# Patient Record
Sex: MALE | Race: WHITE
[De-identification: names, ages, dates, MRNs, and addresses within clinical notes are randomized per-mention and may not be internally consistent; named-entity substitution may affect disease eponyms.]

---

## 2017-01-04 ENCOUNTER — HOSPITAL ENCOUNTER (EMERGENCY)
Dept: HOSPITAL 17 - NEPB | Age: 47
Discharge: HOME | End: 2017-01-04
Payer: SELF-PAY

## 2017-01-04 VITALS — BODY MASS INDEX: 31.05 KG/M2 | WEIGHT: 229.28 LBS | HEIGHT: 72 IN

## 2017-01-04 VITALS
DIASTOLIC BLOOD PRESSURE: 90 MMHG | HEART RATE: 84 BPM | RESPIRATION RATE: 16 BRPM | OXYGEN SATURATION: 98 % | TEMPERATURE: 97.8 F | SYSTOLIC BLOOD PRESSURE: 144 MMHG

## 2017-01-04 DIAGNOSIS — I25.2: ICD-10-CM

## 2017-01-04 DIAGNOSIS — I10: ICD-10-CM

## 2017-01-04 DIAGNOSIS — H69.83: ICD-10-CM

## 2017-01-04 DIAGNOSIS — J01.90: Primary | ICD-10-CM

## 2017-01-04 DIAGNOSIS — J45.909: ICD-10-CM

## 2017-01-04 PROCEDURE — 99283 EMERGENCY DEPT VISIT LOW MDM: CPT

## 2017-01-04 NOTE — PD
HPI


Chief Complaint:  ENT Complaint


Time Seen by Provider:  06:40


Travel History


International Travel<30 days:  No


Contact w/Intl Traveler<30days:  No


Traveled to known affect area:  No





History of Present Illness


HPI


This is a 46-year-old male who presents for evaluation of nasal congestion, 

sinus pressure, bilateral ear pressure and decreased hearing in the ears.  

Symptoms started 4 days ago.  He feels as if sound is muffled in both ears.  He 

has been using over-the-counter cough and cold medications but symptoms 

persisted which prompted evaluation.  Denies any ear discharge.  No fevers or 

chills.  No recent travel, recent swimming.  He has no other complaints at this 

time.





PFSH


Past Medical History


Hx Anticoagulant Therapy:  Yes (ASA)


Arthritis:  No


Asthma:  Yes


Autoimmune Disease:  No


Blood Disorders:  No


Anxiety:  No


Depression:  No


Heart Rhythm Problems:  Yes (BRADYCARDIC)


Cancer:  No


Cardiac Catheterization:  Yes


Cardiovascular Problems:  Yes (MI 3-4 YRS AGO)


High Cholesterol:  No


Chemotherapy:  No


Chest Pain:  Yes


Congestive Heart Failure:  No


COPD:  No


Cerebrovascular Accident:  No


Diabetes:  No


Diminished Hearing:  No


Endocrine:  No


Gastrointestinal Disorders:  No


GERD:  No


Glaucoma:  No


Genitourinary:  No


Headaches:  No


Hepatitis:  No


Hiatal Hernia:  No


Heparin Induced Thrombocytopen:  No


Hypertension:  Yes


Immune Disorder:  No


Implanted Vascular Access Dvce:  No


Kidney Stones:  Yes


Musculoskeletal:  No


Neurologic:  No


Psychiatric:  No


Reproductive:  No


Respiratory:  Yes


Immunizations Current:  Yes


Migraines:  No


Myocardial Infarction:  Yes (X 2)


Radiation Therapy:  No


Renal Failure:  No


Seizures:  No


Sickle Cell Disease:  No


Sleep Apnea:  No


Thyroid Disease:  No


Ulcer:  No


Tetanus Vaccination:  < 5 Years


Influenza Vaccination:  Yes


PNEUMOCCOCAL Vaccine (Year):  1





Past Surgical History


Abdominal Surgery:  Yes (gallbladder out)


AICD:  No


Appendectomy:  No


Arteriovenous Shunt:  No


Cardiac Surgery:  No


Cholecystectomy:  Yes


Coronary Artery Bypass Graft:  No


Ear Surgery:  No


Endocrine Surgery:  No


Eye Surgery:  No


Genitourinary Surgery:  No


Gynecologic Surgery:  No


Insulin Pump:  No


Joint Replacement:  No


Neurologic Surgery:  No


Pacemaker:  No


Thoracic Surgery:  No


Tonsillectomy:  Yes


Other Surgery:  Yes





Family History


Family Myocardial Infarction:  Yes (GRANDFATHER MATERNIAL)





Social History


Alcohol Use:  No (DENIES)


Tobacco Use:  Yes (9-10 CIGARETTES)


Substance Use:  No (DENIES)





Allergies-Medications


(Allergen,Severity, Reaction):  


Coded Allergies:  


     No Known Allergies (Verified , 1/4/17)


Reported Meds & Prescriptions





Reported Meds & Active Scripts


Active








Review of Systems


Except as stated in HPI:  all other systems reviewed are Neg





Physical Exam


Narrative


GENERAL: Well-developed well-nourished male in no acute distress


SKIN: Warm and dry.


HEAD: Atraumatic. Normocephalic. 


EYES: Pupils equal and round. No scleral icterus. No injection or drainage. 


ENT: No nasal bleeding or discharge.  Mucous membranes pink and moist.  

Bilateral tympanic membranes are intact and visible with minimal cerumen.  

There are air-fluid levels presents without erythema or fluctuance of the 

tympanic membrane's.  No oropharyngeal erythema or exudate.


NECK: Trachea midline. No JVD.  Mild anterior cervical lymphadenopathy is 

present.


CARDIOVASCULAR: Regular rate and rhythm.  No murmur appreciated.


RESPIRATORY: No accessory muscle use. Clear to auscultation. Breath sounds 

equal bilaterally.





Data


Data


Last Documented VS





Vital Signs








  Date Time  Temp Pulse Resp B/P Pulse Ox O2 Delivery O2 Flow Rate FiO2


 


1/4/17 06:41   18     


 


1/4/17 06:32 97.8 84  144/90 98   











MDM


Medical Decision Making


Medical Screen Exam Complete:  Yes


Emergency Medical Condition:  Yes


Medical Record Reviewed:  Yes


Differential Diagnosis


Sinusitis, rhinitis, eustachian tube dysfunction, otitis media, serous otitis 

media, sensorineural hearing loss, cerumen impactions


Narrative Course


46-year-old male who has been having nasal congestion, bilateral ear pressure 

and decreased hearing in the ears for the past 4 days.  His examination and 

history are consistent with sinusitis with eustachian tube dysfunction, no 

evidence for acute otitis media.  Plan is to discharge the patient with Flonase 

steroid spray, amoxicillin.  He is stable for discharge.





Diagnosis





 Primary Impression:  


 Sinusitis


 Qualified Code:  J01.90 - Acute non-recurrent sinusitis, unspecified location


 Additional Impression:  


 Eustachian tube dysfunction


 Qualified Code:  H69.83 - Eustachian tube dysfunction, bilateral


Departure Forms:  Tests/Procedures, Work Release   Enter return to work date:  

Jan 5, 2017





***Additional Instructions:


Use the medication as prescribed.  Use over-the-counter nasal decongestants.  

Avoid tobacco products.  Follow-up with primary care physician.  Return for any 

emergent medical conditions.


***Med/Other Pt SpecificInfo:  Prescription(s) given


Scripts


Fluticasone Nasal [place] (Flonase Allergy Relief Children Nasal Spray)50 Mcg/Act 

Spray2 Spray EACH NARE DAILY  10 Days  Ref 0


   50 mcg/spray


   Prov:Saundra Monet MD         1/4/17 


Amoxicillin 875 Mg Nzp240 Mg PO BID  10 Days  Ref 0


   Prov:Saundra Monet MD         1/4/17


Disposition:  01 DISCHARGE HOME


Condition:  Stable








Dominik Driscoll Jan 4, 2017 06:50

## 2017-08-24 ENCOUNTER — HOSPITAL ENCOUNTER (OUTPATIENT)
Dept: HOSPITAL 17 - NEPE | Age: 47
Setting detail: OBSERVATION
LOS: 1 days | Discharge: HOME | End: 2017-08-25
Attending: SPECIALIST | Admitting: SPECIALIST
Payer: SELF-PAY

## 2017-08-24 VITALS
RESPIRATION RATE: 16 BRPM | HEART RATE: 66 BPM | OXYGEN SATURATION: 98 % | TEMPERATURE: 98.1 F | SYSTOLIC BLOOD PRESSURE: 116 MMHG | DIASTOLIC BLOOD PRESSURE: 75 MMHG

## 2017-08-24 VITALS — HEIGHT: 72 IN | WEIGHT: 207.23 LBS | BODY MASS INDEX: 28.07 KG/M2

## 2017-08-24 VITALS
RESPIRATION RATE: 16 BRPM | SYSTOLIC BLOOD PRESSURE: 137 MMHG | DIASTOLIC BLOOD PRESSURE: 71 MMHG | HEART RATE: 100 BPM | OXYGEN SATURATION: 98 %

## 2017-08-24 VITALS
DIASTOLIC BLOOD PRESSURE: 61 MMHG | RESPIRATION RATE: 16 BRPM | HEART RATE: 100 BPM | SYSTOLIC BLOOD PRESSURE: 115 MMHG | OXYGEN SATURATION: 98 %

## 2017-08-24 VITALS
HEART RATE: 62 BPM | OXYGEN SATURATION: 98 % | TEMPERATURE: 97.8 F | RESPIRATION RATE: 16 BRPM | SYSTOLIC BLOOD PRESSURE: 138 MMHG | DIASTOLIC BLOOD PRESSURE: 71 MMHG

## 2017-08-24 VITALS
RESPIRATION RATE: 16 BRPM | DIASTOLIC BLOOD PRESSURE: 76 MMHG | HEART RATE: 88 BPM | SYSTOLIC BLOOD PRESSURE: 121 MMHG | OXYGEN SATURATION: 98 %

## 2017-08-24 VITALS
OXYGEN SATURATION: 98 % | HEART RATE: 100 BPM | RESPIRATION RATE: 16 BRPM | SYSTOLIC BLOOD PRESSURE: 134 MMHG | DIASTOLIC BLOOD PRESSURE: 94 MMHG

## 2017-08-24 VITALS
HEART RATE: 110 BPM | OXYGEN SATURATION: 98 % | DIASTOLIC BLOOD PRESSURE: 78 MMHG | RESPIRATION RATE: 16 BRPM | TEMPERATURE: 97.7 F | SYSTOLIC BLOOD PRESSURE: 144 MMHG

## 2017-08-24 VITALS
RESPIRATION RATE: 18 BRPM | TEMPERATURE: 97.9 F | DIASTOLIC BLOOD PRESSURE: 58 MMHG | OXYGEN SATURATION: 98 % | HEART RATE: 60 BPM | SYSTOLIC BLOOD PRESSURE: 108 MMHG

## 2017-08-24 VITALS — HEART RATE: 61 BPM

## 2017-08-24 DIAGNOSIS — I11.9: ICD-10-CM

## 2017-08-24 DIAGNOSIS — R00.1: ICD-10-CM

## 2017-08-24 DIAGNOSIS — Z79.82: ICD-10-CM

## 2017-08-24 DIAGNOSIS — E78.5: ICD-10-CM

## 2017-08-24 DIAGNOSIS — R05: ICD-10-CM

## 2017-08-24 DIAGNOSIS — R07.9: Primary | ICD-10-CM

## 2017-08-24 DIAGNOSIS — F17.200: ICD-10-CM

## 2017-08-24 DIAGNOSIS — D72.829: ICD-10-CM

## 2017-08-24 DIAGNOSIS — I25.2: ICD-10-CM

## 2017-08-24 DIAGNOSIS — I42.9: ICD-10-CM

## 2017-08-24 DIAGNOSIS — I25.10: ICD-10-CM

## 2017-08-24 LAB
ALP SERPL-CCNC: 115 U/L (ref 45–117)
ALT SERPL-CCNC: 29 U/L (ref 12–78)
ANION GAP SERPL CALC-SCNC: 10 MEQ/L (ref 5–15)
APTT BLD: 25.9 SEC (ref 24.3–30.1)
AST SERPL-CCNC: 21 U/L (ref 15–37)
BASOPHILS # BLD AUTO: 0 TH/MM3 (ref 0–0.2)
BASOPHILS NFR BLD: 0.2 % (ref 0–2)
BILIRUB SERPL-MCNC: 0.6 MG/DL (ref 0.2–1)
BUN SERPL-MCNC: 7 MG/DL (ref 7–18)
CHLORIDE SERPL-SCNC: 111 MEQ/L (ref 98–107)
EOSINOPHIL # BLD: 0.1 TH/MM3 (ref 0–0.4)
EOSINOPHIL NFR BLD: 0.6 % (ref 0–4)
ERYTHROCYTE [DISTWIDTH] IN BLOOD BY AUTOMATED COUNT: 14.3 % (ref 11.6–17.2)
GFR SERPLBLD BASED ON 1.73 SQ M-ARVRAT: 68 ML/MIN (ref 89–?)
HCO3 BLD-SCNC: 21.4 MEQ/L (ref 21–32)
HCT VFR BLD CALC: 49.4 % (ref 39–51)
HEMO FLAGS: (no result)
INR PPP: 1 RATIO
LYMPHOCYTES # BLD AUTO: 1.5 TH/MM3 (ref 1–4.8)
LYMPHOCYTES NFR BLD AUTO: 8.5 % (ref 9–44)
MCH RBC QN AUTO: 29.4 PG (ref 27–34)
MCHC RBC AUTO-ENTMCNC: 33.3 % (ref 32–36)
MCV RBC AUTO: 88.2 FL (ref 80–100)
MONOCYTES NFR BLD: 4.6 % (ref 0–8)
NEUTROPHILS # BLD AUTO: 15.3 TH/MM3 (ref 1.8–7.7)
NEUTROPHILS NFR BLD AUTO: 86.1 % (ref 16–70)
PLATELET # BLD: 235 TH/MM3 (ref 150–450)
POTASSIUM SERPL-SCNC: 4 MEQ/L (ref 3.5–5.1)
PROTHROMBIN TIME: 10.7 SEC (ref 9.8–11.6)
RBC # BLD AUTO: 5.6 MIL/MM3 (ref 4.5–5.9)
SODIUM SERPL-SCNC: 142 MEQ/L (ref 136–145)
WBC # BLD AUTO: 17.7 TH/MM3 (ref 4–11)

## 2017-08-24 PROCEDURE — 83690 ASSAY OF LIPASE: CPT

## 2017-08-24 PROCEDURE — C1769 GUIDE WIRE: HCPCS

## 2017-08-24 PROCEDURE — 87040 BLOOD CULTURE FOR BACTERIA: CPT

## 2017-08-24 PROCEDURE — 85025 COMPLETE CBC W/AUTO DIFF WBC: CPT

## 2017-08-24 PROCEDURE — 80061 LIPID PANEL: CPT

## 2017-08-24 PROCEDURE — 84484 ASSAY OF TROPONIN QUANT: CPT

## 2017-08-24 PROCEDURE — 93005 ELECTROCARDIOGRAM TRACING: CPT

## 2017-08-24 PROCEDURE — 80053 COMPREHEN METABOLIC PANEL: CPT

## 2017-08-24 PROCEDURE — 71010: CPT

## 2017-08-24 PROCEDURE — 85027 COMPLETE CBC AUTOMATED: CPT

## 2017-08-24 PROCEDURE — 93454 CORONARY ARTERY ANGIO S&I: CPT

## 2017-08-24 PROCEDURE — 83605 ASSAY OF LACTIC ACID: CPT

## 2017-08-24 PROCEDURE — 85610 PROTHROMBIN TIME: CPT

## 2017-08-24 PROCEDURE — 87804 INFLUENZA ASSAY W/OPTIC: CPT

## 2017-08-24 PROCEDURE — G0378 HOSPITAL OBSERVATION PER HR: HCPCS

## 2017-08-24 PROCEDURE — 93306 TTE W/DOPPLER COMPLETE: CPT

## 2017-08-24 PROCEDURE — 99285 EMERGENCY DEPT VISIT HI MDM: CPT

## 2017-08-24 PROCEDURE — C1893 INTRO/SHEATH, FIXED,NON-PEEL: HCPCS

## 2017-08-24 PROCEDURE — 85730 THROMBOPLASTIN TIME PARTIAL: CPT

## 2017-08-24 RX ADMIN — NITROGLYCERIN SCH MG: 0.4 TABLET SUBLINGUAL at 15:01

## 2017-08-24 RX ADMIN — NITROGLYCERIN SCH MG: 0.4 TABLET SUBLINGUAL at 15:00

## 2017-08-24 RX ADMIN — ENOXAPARIN SODIUM SCH MG: 60 INJECTION SUBCUTANEOUS at 17:37

## 2017-08-24 RX ADMIN — NITROGLYCERIN SCH INCH: 20 OINTMENT TOPICAL at 17:37

## 2017-08-24 RX ADMIN — FAMOTIDINE SCH MG: 20 TABLET, FILM COATED ORAL at 20:37

## 2017-08-24 RX ADMIN — CARVEDILOL SCH MG: 3.12 TABLET, FILM COATED ORAL at 20:37

## 2017-08-24 RX ADMIN — PHENYTOIN SODIUM SCH MLS/HR: 50 INJECTION INTRAMUSCULAR; INTRAVENOUS at 17:37

## 2017-08-24 NOTE — RADRPT
EXAM DATE/TIME:  08/24/2017 12:17 

 

[facility] COMPARISON:     

CHEST SINGLE AP, April 24, 2014, 17:45.

 

                     

INDICATIONS :     

Chest pain today.

                     

 

MEDICAL HISTORY :     

Myocardial infarction.          

 

SURGICAL HISTORY :     

None.   

 

ENCOUNTER:     

Initial                                        

 

ACUITY:     

1 day      

 

PAIN SCORE:     

9/10

 

LOCATION:     

Bilateral chest 

 

FINDINGS:     

A single view of the chest demonstrates the lungs to be symmetrically aerated without evidence of mas
s, infiltrate or effusion.  The cardiomediastinal contours are unremarkable.  Osseous structures are 
intact.

 

CONCLUSION:     

1. No acute cardiopulmonary disease.

 

 

 

 Waldo Rodarte MD on August 24, 2017 at 12:38           

Board Certified Radiologist.

 This report was verified electronically.

## 2017-08-24 NOTE — HHI.HP
HPI


Service


[facility]


Primary Care Physician


Vesta Dasilva D.O.


Admission Diagnosis





chest pain


Diagnoses:  


Chief Complaint:  


cp, sob


Travel History


International Travel<30 Days:  No


Contact w/Intl Traveler <30 Da:  No


Traveled to Known Affected Are:  No


History of Present Illness


This a pleasant 47-year-old male with history of coronary artery disease, prior 

myocardial infarction, hypertension, hyperlipidemia, tobacco abuse.  Patient 

presented to the emergency room with complaints of chest pressure that started 

around 5 AM, it woke him out of sleep.  Indicates that it felt like a weight 

sitting on his chest associated with shortness of breath and diaphoresis.  

There was no radiation.  He took an aspirin and dropped to the emergency room..

  Pain was relieved by nitroglycerin given in the emergency room.  Indicates 

that chest discomfort was similar when he had his MI.  Patient also endorses 

that he went to work yesterday and throughout the day he felt short of breath 

with activity.  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 

lot but he keeps hydrated.  Denies any fever, no chills.  Indicates that his 

cardiologist is Dr. Jackson home he hasn't seen an 8 months, also endorses 

a history of irregular rhythm.  Patient had a cardiac catheter in  that 

showed mild CAD to the LAD with EF of 30-40%.  An echo in 2013 showed 

EF of 45-50%.At one point he was being considered for AICD however his EF 

improved with medical management.  He is not able to recall what medications he 

is taking at this time.  Laboratory workup was completed in the emergency room, 

troponin was negative.  He was noted with leukocytosis, no fever.  He was 

tachycardic in the emergency room, blood pressure was stable.  Cultures were 

obtained.  Patient was given aspirin, pravastatin and nitroglycerin sublingual.

  EKG did not reveal any acute findings.  At this time, patient remains stable, 

heart rate has come down to the 60s.  Patient is admitted for further 

evaluation and treatment.





Review of Systems


Constitutional:  DENIES: Diaphoretic episodes, Fatigue, Fever, Weight gain, 

Weight loss, Chills, Dizziness, Change in appetite, Night Sweats


Endocrine:  DENIES: Heat/cold intolerance, Polydipsia, Polyuria, Polyphagia


Eyes:  DENIES: Blurred vision, Diplopia, Eye inflammation, Eye pain, Vision loss

, Photosensitivity, Double Vision


Ears, nose, mouth, throat:  DENIES: Tinnitus, Hearing loss, Vertigo, Nasal 

discharge, Oral lesions, Throat pain, Hoarseness, Ear Pain, Running Nose, 

Epistaxis, Sinus Pain, Toothache, Odynophagia


Respiratory:  DENIES: Apneas, Cough, Snoring, Wheezing, Hemoptysis, Sputum 

production, Shortness of breath


Cardiovascular:  COMPLAINS OF: Chest pain, Dyspnea on Exertion, DENIES: 

Palpitations, Syncope, PND, Lower Extremity Edema, Orthopnea, Claudication


Gastrointestinal:  DENIES: Abdominal pain, Black stools, Bloody stools, 

Constipation, Diarrhea, Nausea, Vomiting, Difficulty Swallowing, Anorexia


Genitourinary:  DENIES: Sexual dysfunction, Urinary frequency, Urinary 

incontinence, Urgency, Hematuria, Dysuria, Nocturia, Penile Discharge, 

Testicular Pain, Testicular Swelling


Musculoskeletal:  DENIES: Joint pain, Muscle aches, Stiffness, Joint Swelling, 

Back pain, Neck pain


Integumentary:  DENIES: Abnormal pigmentation, Nail changes, Pruritus, Rash


Hematologic/lymphatic:  DENIES: Bruising, Lymphadenopathy


Immunologic/allergic:  DENIES: Eczema, Urticaria


Neurologic:  DENIES: Abnormal gait, Headache, Localized weakness, Paresthesias, 

Seizures, Speech Problems, Tremor, Poor Balance


Psychiatric:  DENIES: Anxiety, Confusion, Mood changes, Depression, 

Hallucinations, Agitation, Suicidal Ideation, Homicidal Ideation, Delusions





Past Family Social History


Past Medical History


Hypertension


Prior history of myocardial infarction x2 


Cardiomyopathy, echo in 2013 showed EF of 45-50%, treated medically


Irregular heartbeat


Hyperlipidemia


Possible COPD


Tobacco abuse


Past Surgical History


Cardiac catheter in 2013 showed mild CAD to the LAD, managed medically


Cholecystectomy


Reported Medications





Reported Meds & Active Scripts


Active


Lisinopril


Aspirin


Cholesterol medication


Allergies:  


Coded Allergies:  


     No Known Allergies (Verified , 17)


Active Ordered Medications





Inpatient Medications


Aspirin (Aspirin) 325 mg DAILY PO ;  Start 17 at 09:00


Carvedilol (Coreg) 3.125 mg Q12HR PO ;  Start 17 at 21:00


Enoxaparin Sodium (Lovenox Inj) 60 mg Q12H SQ ;  Start 17 at 17:00


Famotidine (Pepcid) 20 mg BID PO ;  Start 17 at 21:00


Nitroglycerin (Nitroglycerin 2% Oint) 1 inch Q6HR TOPICAL ;  Start 17 at 18

:00


Nitroglycerin (Nitrostat Sl) 0.4 mg Q5M SL  Last administered on t 15:01

;  Start 17 at 15:00;  Stop 17 at 15:11;  Status DC


Pravastatin Sodium (Pravachol) 40 mg DAILY PO ;  Start 17 at 09:00


Sodium Chloride 1,000 ml @  84 mls/hr [id number] IV ;  Start 17 at 16:00


Family History


Mother and father both disease, he thinks they had a history coronary artery 

disease, CHF


Social History


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  Smokes 1 pack a day for 

15 years, drinks 1 beer a day, no illegal drug use.





Physical Exam


Vital Signs





Vital Signs








  Date Time  Temp Pulse Resp B/P (MAP) Pulse Ox O2 Delivery O2 Flow Rate FiO2


 


17 16:14 97.8 62 16 138/71 (93) 98   


 


17 14:00 98.1 66 16 116/75 (89) 98   


 


17 12:30  88 16 121/76 (91) 98   


 


17 11:43  100 16 115/61 (79) 98   


 


17 11:35  100 16 137/71 (93) 98   


 


17 11:27  100 16 134/94 (107) 98   


 


17 11:18  114 18  97 Room Air  


 


17 11:13 97.7 110 16 144/78 (100) 98   








Physical Exam


GENERAL: This is a well-nourished, well-developed patient, in no apparent 

distress.


SKIN: No rashes, ecchymoses or lesions. Cool and dry.


HEAD: Atraumatic. Normocephalic. No temporal or scalp tenderness.


EYES: Pupils equal round and reactive. Extraocular motions intact. No scleral 

icterus. No injection or drainage. 


ENT: Nose without bleeding, purulent drainage or septal hematoma. Throat 

without erythema, tonsillar hypertrophy or exudate. Uvula midline. Airway 

patent.


NECK: Trachea midline. No JVD or lymphadenopathy. Supple, nontender, no 

meningeal signs.


CARDIOVASCULAR: Regular rate and rhythm without murmurs, gallops, or rubs. 


RESPIRATORY: Faint expiratory wheezing, diminished at bases


GASTROINTESTINAL: Abdomen soft, non-tender, nondistended. No hepato-splenomegaly

, or palpable masses. No guarding.


MUSCULOSKELETAL: Extremities without clubbing, cyanosis, or edema. No joint 

tenderness, effusion, or edema noted. No calf tenderness. Negative Homans sign 

bilaterally.


NEUROLOGICAL: Awake, oriented 3.  No focal deficits


Laboratory





Laboratory Tests








Test


  17


11:50 17


13:38


 


White Blood Count 17.7  


 


Red Blood Count 5.60  


 


Hemoglobin 16.5  


 


Hematocrit 49.4  


 


Mean Corpuscular Volume 88.2  


 


Mean Corpuscular Hemoglobin 29.4  


 


Mean Corpuscular Hemoglobin


Concent 33.3 


  


 


 


Red Cell Distribution Width 14.3  


 


Platelet Count 235  


 


Mean Platelet Volume 9.1  


 


Neutrophils (%) (Auto) 86.1  


 


Lymphocytes (%) (Auto) 8.5  


 


Monocytes (%) (Auto) 4.6  


 


Eosinophils (%) (Auto) 0.6  


 


Basophils (%) (Auto) 0.2  


 


Neutrophils # (Auto) 15.3  


 


Lymphocytes # (Auto) 1.5  


 


Monocytes # (Auto) 0.8  


 


Eosinophils # (Auto) 0.1  


 


Basophils # (Auto) 0.0  


 


CBC Comment DIFF FINAL  


 


Differential Comment   


 


Prothrombin Time 10.7  


 


Prothromb Time International


Ratio 1.0 


  


 


 


Activated Partial


Thromboplast Time 25.9 


  


 


 


Blood Urea Nitrogen 7  


 


Creatinine 1.15  


 


Random Glucose 82  


 


Total Protein 7.1  


 


Albumin 3.5  


 


Calcium Level 8.8  


 


Alkaline Phosphatase 115  


 


Aspartate Amino Transf


(AST/SGOT) 21 


  


 


 


Alanine Aminotransferase


(ALT/SGPT) 29 


  


 


 


Total Bilirubin 0.6  


 


Sodium Level 142  


 


Potassium Level 4.0  


 


Chloride Level 111  


 


Carbon Dioxide Level 21.4  


 


Anion Gap 10  


 


Estimat Glomerular Filtration


Rate 68 


  


 


 


Troponin I LESS THAN 0.02  


 


Lactic Acid Level  1.0 


 


Lipase  107 














 Date/Time


Source Procedure


Growth Status


 


 


 17 13:43


Blood Peripheral Aerobic Blood Culture


Pending Received


 


 17 13:43


Blood Peripheral Anaerobic Blood Culture


Pending Received


 


 17 13:44


Nasal Washing Influenza Types A,B Antigen (MIKAYLA) - Final


NEGATIVE FOR FLU A AND B ANTIGEN.... Complete








Result Diagram:  


17 1150                                                                   

             17 1150





Imaging





Last Impressions








Chest X-Ray 17 0000 Signed





Impressions: 





 Service Date/Time:   12:17 - CONCLUSION:  1. No acute 





 cardiopulmonary disease.     Alireza Bozorgmanesh, MD Caprini VTE Risk Assessment


Caprinxiomara VTE Risk Assessment:  No/Low Risk (score <= 1)


Caprini Risk Assessment Model











 Point Value = 1          Point Value = 2  Point Value = 3  Point Value = 5


 


Age 41-60


Minor surgery


BMI > 25 kg/m2


Swollen legs


Varicose veins


Pregnancy or postpartum


History of unexplained or recurrent


   spontaneous 


Oral contraceptives or hormone


   replacement


Sepsis (< 1 month)


Serious lung disease, including


   pneumonia (< 1 month)


Abnormal pulmonary function


Acute myocardial infarction


Congestive heart failure (< 1 month)


History of inflammatory bowel disease


Medical patient at bed rest Age 61-74


Arthroscopic surgery


Major open surgery (> 45 min)


Laparoscopic surgery (> 45 min)


Malignancy


Confined to bed (> 72 hours)


Immobilizing plaster cast


Central venous access Age >= 75


History of VTE


Family history of VTE


Factor V Leiden


Prothrombin 00699O


Lupus anticoagulant


Anticardiolipin antibodies


Elevated serum homocysteine


Heparin-induced thrombocytopenia


Other congenital or acquired


   thrombophilia Stroke (< 1 month)


Elective arthroplasty


Hip, pelvis, or leg fracture


Acute spinal cord injury (< 1 month)








Prophylaxis Regimen











   Total Risk


Factor Score Risk Level Prophylaxis Regimen


 


0-1      Low Early ambulation


 


2 Moderate Order ONE of the following:


*Sequential Compression Device (SCD)


*Heparin 5000 units SQ BID


 


3-4 Higher Order ONE of the following medications:


*Heparin 5000 units SQ TID


*Enoxaparin/Lovenox 40 mg SQ daily (WT < 150 kg, CrCl > 30 mL/min)


*Enoxaparin/Lovenox 30 mg SQ daily (WT < 150 kg, CrCl > 10-29 mL/min)


*Enoxaparin/Lovenox 30 mg SQ BID (WT < 150 kg, CrCl > 30 mL/min)


AND/OR


*Sequential Compression Device (SCD)


 


5 or more Highest Order ONE of the following medications:


*Heparin 5000 units SQ TID (Preferred with Epidurals)


*Enoxaparin/Lovenox 40 mg SQ daily (WT < 150 kg, CrCl > 30 mL/min)


*Enoxaparin/Lovenox 30 mg SQ daily (WT < 150 kg, CrCl > 10-29 mL/min)


*Enoxaparin/Lovenox 30 mg SQ BID (WT < 150 kg, CrCl > 30 mL/min)


AND


*Sequential Compression Device (SCD)











Assessment and Plan


Problem List:  


(1) Chest pain


ICD Codes:  R07.9 - Chest pain, unspecified


Status:  Acute


(2) Cardiomyopathy


ICD Codes:  I42.9 - Cardiomyopathy, unspecified


Status:  Chronic


(3) CAD (coronary artery disease)


ICD Codes:  I25.10 - Atherosclerotic heart disease of native coronary artery 

without angina pectoris


Status:  Acute


(4) Hx of myocardial infarction


ICD Codes:  I25.2 - Old myocardial infarction


Status:  Chronic


(5) HTN (hypertension)


ICD Codes:  I10 - Essential (primary) hypertension


Status:  Chronic


(6) Tobacco abuse


ICD Codes:  Z72.0 - Tobacco use


Status:  Acute


(7) Hyperlipidemia


ICD Codes:  E78.5 - Hyperlipidemia, unspecified


Status:  Chronic


(8) Leukocytosis


ICD Codes:  D72.829 - Elevated white blood cell count, unspecified


Status:  Acute


Assessment and Plan


Admit to Dr. Morrow





47-year-old with history of coronary artery disease, presented to the emergency 

room with symptoms of dyspnea with exertion and chest pressure.  Initial 

troponin negative.





Chest pain, rule out acute coronary syndrome in patient with known coronary 

artery disease


-Continue with serial troponin


-Consult cardiology for evaluation


-Continue with aspirin, carvedilol, statin


-Nitroglycerin ointment 1 inch every 6


-Lovenox 60 mg subcutaneous every 12





History of cardiomyopathy, reports dyspnea with exertion.  Last echocardiogram 

in  showed EF of 45-50%


-2-D echo has been ordered





Hypertension


-Continue home medications





Hyperlipidemia


-Lipid profile for the morning


-Continue with pravastatin





Leukocytosis, etiology unclear, possibly secondary to stress response.  No fever

, no cough, no sputum production


-Hydrate cautiously, decrease IV fluids to 50 an hour





Home medications reviewed, initiated as indicated


Lovenox For DVT prophylaxis


Pepcid for GI prophylaxis





Plan of care discussed with the patient, attending and registered nurse.  

Further management of the patient will be dependent on the hospital course





This patient was seen by myself and Dr. Morrow, this H&P is written on his behalf





Problem Qualifiers





(1) Chest pain:  


Qualified Codes:  R07.9 - Chest pain, unspecified


(2) CAD (coronary artery disease):  


Qualified Codes:  I25.118 - Atherosclerotic heart disease of native coronary 

artery with other forms of angina pectoris


(3) HTN (hypertension):  


Qualified Codes:  I10 - Essential (primary) hypertension


(4) Hyperlipidemia:  


Qualified Codes:  E78.5 - Hyperlipidemia, unspecified


(5) Leukocytosis:  


Qualified Codes:  D72.829 - Elevated white blood cell count, unspecified








Joan Alex Aug 24, 2017 17:53

## 2017-08-24 NOTE — PD
HPI


Chief Complaint:  Cardiac Complaint


Time Seen by Provider:  11:22


Travel History


International Travel<30 days:  No


Contact w/Intl Traveler<30days:  No


Traveled to known affect area:  No





History of Present Illness


HPI


This is a 47-year-old male who reports that he's had 2 heart attacks in the 

past who presents to the emergency Department with onset of chest discomfort on 

the left side of his chest that started at 5 AM described as a pressure, 

constant, moderate severity associated with diaphoresis.  He denies any 

shortness of breath.  This is happened to him in the past he says it feels like 

his prior heart attacks.  He says he follows with Dr. Jackson.  He last saw 

her about 8 months ago.  He says she follows him for some sort of abnormal 

heart rhythm.  He denies any fevers or chills, cough, rhinorrhea or recent 

illness and he has not been on any prednisone recently.





PFSH


Past Medical History


Hx Anticoagulant Therapy:  Yes (ASA)


Arthritis:  No


Asthma:  Yes


Autoimmune Disease:  No


Blood Disorders:  No


Anxiety:  No


Depression:  No


Heart Rhythm Problems:  Yes (BRADYCARDIC)


Cancer:  No


Cardiac Catheterization:  Yes


Cardiovascular Problems:  Yes


High Cholesterol:  No


Chemotherapy:  No


Chest Pain:  Yes


Congestive Heart Failure:  No


COPD:  No


Cerebrovascular Accident:  No


Diabetes:  No


Diminished Hearing:  No


Endocrine:  No


Gastrointestinal Disorders:  No


GERD:  No


Glaucoma:  No


Genitourinary:  No


Headaches:  No


Hepatitis:  No


Hiatal Hernia:  No


Heparin Induced Thrombocytopen:  No


Hypertension:  Yes


Immune Disorder:  No


Implanted Vascular Access Dvce:  No


Kidney Stones:  Yes


Musculoskeletal:  No


Neurologic:  No


Psychiatric:  No


Reproductive:  No


Respiratory:  Yes


Immunizations Current:  Yes


Migraines:  No


Myocardial Infarction:  Yes (X 2)


Radiation Therapy:  No


Renal Failure:  No


Seizures:  No


Sickle Cell Disease:  No


Sleep Apnea:  No


Thyroid Disease:  No


Ulcer:  No


PNEUMOCCOCAL Vaccine (Year):  1





Past Surgical History


Abdominal Surgery:  Yes (gallbladder out)


AICD:  No


Appendectomy:  No


Arteriovenous Shunt:  No


Cardiac Surgery:  No


Cholecystectomy:  Yes


Coronary Artery Bypass Graft:  No


Ear Surgery:  No


Endocrine Surgery:  No


Eye Surgery:  No


Genitourinary Surgery:  No


Gynecologic Surgery:  No


Insulin Pump:  No


Joint Replacement:  No


Neurologic Surgery:  No


Pacemaker:  No


Thoracic Surgery:  No


Tonsillectomy:  Yes


Other Surgery:  Yes





Family History


Family Myocardial Infarction:  Yes (GRANDFATHER MATERNIAL)





Social History


Alcohol Use:  No (DENIES)


Tobacco Use:  Yes (9-10 CIGARETTES)


Substance Use:  No (DENIES)





Allergies-Medications


(Allergen,Severity, Reaction):  


Coded Allergies:  


     No Known Allergies (Verified , 1/4/17)


Reported Meds & Prescriptions





Reported Meds & Active Scripts


Active








Review of Systems


Except as stated in HPI:  all other systems reviewed are Neg





Physical Exam


Narrative


GENERAL:Well appearing, no acute distress, diaphoretic


SKIN: Focused skin assessment warm and dry.


HEAD: Atraumatic. Normocephalic. 


EYES: Pupils equal and round.  No injection or drainage. 


ENT:  Moist mucous membranes


NECK: Trachea midline. 


CARDIOVASCULAR: Regular rate and rhythm.  No murmur appreciated.


RESPIRATORY: Clear to auscultation. Breath sounds equal bilaterally. 


GASTROINTESTINAL: Abdomen soft, non-tender, nondistended. 


MUSCULOSKELETAL: No obvious deformities. 


NEUROLOGICAL: Awake and alert. No obvious cranial nerve deficits.  Moving all 

extremities.


PSYCHIATRIC: Appropriate mood and affect; insight and judgment normal.





Data


Data


Last Documented VS





Vital Signs








  Date Time  Temp Pulse Resp B/P (MAP) Pulse Ox O2 Delivery O2 Flow Rate FiO2


 


8/24/17 12:30  88 16 121/76 (91) 98   


 


8/24/17 11:18      Room Air  


 


8/24/17 11:13 97.7       








Orders





 Orders


Aspirin (Aspirin) (8/24/17 11:25)


Nitroglycerin Sl (Nitrostat Sl) (8/24/17 11:26)


Nitroglycerin Sl (Nitrostat Sl) (8/24/17 11:38)


Chest, Single Ap (8/24/17 )


Comprehensive Metabolic Panel (8/24/17 11:50)


Troponin I (8/24/17 11:50)


Act Partial Throm Time (Ptt) (8/24/17 11:50)


Prothrombin Time / Inr (Pt) (8/24/17 11:50)


Complete Blood Count With Diff (8/24/17 11:50)


Influenzae A/B Antigen (8/24/17 13:18)


Blood Culture (8/24/17 13:18)


Lactic Acid (8/24/17 13:18)


Lipase (8/24/17 13:20)


Admit Order (Ed Use Only) (8/24/17 13:43)





Labs





Laboratory Tests








Test


  8/24/17


11:50 8/24/17


13:38


 


White Blood Count 17.7 TH/MM3  


 


Red Blood Count 5.60 MIL/MM3  


 


Hemoglobin 16.5 GM/DL  


 


Hematocrit 49.4 %  


 


Mean Corpuscular Volume 88.2 FL  


 


Mean Corpuscular Hemoglobin 29.4 PG  


 


Mean Corpuscular Hemoglobin


Concent 33.3 % 


  


 


 


Red Cell Distribution Width 14.3 %  


 


Platelet Count 235 TH/MM3  


 


Mean Platelet Volume 9.1 FL  


 


Neutrophils (%) (Auto) 86.1 %  


 


Lymphocytes (%) (Auto) 8.5 %  


 


Monocytes (%) (Auto) 4.6 %  


 


Eosinophils (%) (Auto) 0.6 %  


 


Basophils (%) (Auto) 0.2 %  


 


Neutrophils # (Auto) 15.3 TH/MM3  


 


Lymphocytes # (Auto) 1.5 TH/MM3  


 


Monocytes # (Auto) 0.8 TH/MM3  


 


Eosinophils # (Auto) 0.1 TH/MM3  


 


Basophils # (Auto) 0.0 TH/MM3  


 


CBC Comment DIFF FINAL  


 


Differential Comment   


 


Prothrombin Time 10.7 SEC  


 


Prothromb Time International


Ratio 1.0 RATIO 


  


 


 


Activated Partial


Thromboplast Time 25.9 SEC 


  


 


 


Blood Urea Nitrogen 7 MG/DL  


 


Creatinine 1.15 MG/DL  


 


Random Glucose 82 MG/DL  


 


Total Protein 7.1 GM/DL  


 


Albumin 3.5 GM/DL  


 


Calcium Level 8.8 MG/DL  


 


Alkaline Phosphatase 115 U/L  


 


Aspartate Amino Transf


(AST/SGOT) 21 U/L 


  


 


 


Alanine Aminotransferase


(ALT/SGPT) 29 U/L 


  


 


 


Total Bilirubin 0.6 MG/DL  


 


Sodium Level 142 MEQ/L  


 


Potassium Level 4.0 MEQ/L  


 


Chloride Level 111 MEQ/L  


 


Carbon Dioxide Level 21.4 MEQ/L  


 


Anion Gap 10 MEQ/L  


 


Estimat Glomerular Filtration


Rate 68 ML/MIN 


  


 


 


Troponin I


  LESS THAN 0.02


NG/ML 


 


 


Lactic Acid Level  1.0 mmol/L 


 


Lipase  107 U/L 











[facility]


Medical Decision Making


Medical Screen Exam Complete:  Yes


Emergency Medical Condition:  Yes


Medical Record Reviewed:  Yes (patient was admitted in 2013 and at that time 

presented with chest pain.  He had an elevated troponin of 0.6.  He had a 

cardiac catheterization demonstrating no obvious occlusion but a reduced 

ejection fraction of 35%.  At that time he also had a leukocytosis of 20, 

similar to his presentation today)


Interpretation(s)


EKG: Normal sinus rhythm, no st changes


Leukocytosis


86% neutrophils


Electrolytes are reassuring


Troponin is normal


Lipase is normal


Lactic acid is 1


Chest x-ray: No acute process


Differential Diagnosis


Acute coronary syndrome, myocarditis, pneumonia, influenza


Narrative Course


This is a 47-year-old male who presents to the emergency department with left-

sided chest discomfort.  He had a strange admission back in 2013 where he had 

an elevated troponin but no coronary artery occlusion and was found to have a 

reduced ejection fraction at that time.  He says his symptoms are very similar.

  He was placed on a monitor and an IV was established.  EKG is nonischemic.  

Labs demonstrate a leukocytosis of 17 and a normal troponin.  Chest x-ray is 

unremarkable.  He doesn't localize any infectious signs.  I suspect is a viral 

syndrome and that they be causing his symptoms however given his history I 

think it's reasonable to place him in observation and obtain serial cardiac 

enzymes and discuss this case with cardiology.  Cultures were obtained and if 

the patient spikes a fever he should be covered with antibiotics.





Diagnosis





 Primary Impression:  


 Chest pain


 Qualified Codes:  R07.9 - Chest pain, unspecified





Admitting Information


Admitting Physician Requests:  Maria Ines Stock MD Aug 24, 2017 15:09

## 2017-08-25 VITALS
DIASTOLIC BLOOD PRESSURE: 57 MMHG | RESPIRATION RATE: 16 BRPM | TEMPERATURE: 97.8 F | SYSTOLIC BLOOD PRESSURE: 106 MMHG | OXYGEN SATURATION: 98 % | HEART RATE: 76 BPM

## 2017-08-25 VITALS
OXYGEN SATURATION: 99 % | DIASTOLIC BLOOD PRESSURE: 63 MMHG | TEMPERATURE: 97.7 F | SYSTOLIC BLOOD PRESSURE: 102 MMHG | HEART RATE: 57 BPM | RESPIRATION RATE: 20 BRPM

## 2017-08-25 VITALS
RESPIRATION RATE: 20 BRPM | SYSTOLIC BLOOD PRESSURE: 125 MMHG | HEART RATE: 57 BPM | TEMPERATURE: 97.8 F | DIASTOLIC BLOOD PRESSURE: 58 MMHG | OXYGEN SATURATION: 96 %

## 2017-08-25 VITALS
RESPIRATION RATE: 16 BRPM | DIASTOLIC BLOOD PRESSURE: 74 MMHG | SYSTOLIC BLOOD PRESSURE: 124 MMHG | TEMPERATURE: 97.4 F | OXYGEN SATURATION: 96 % | HEART RATE: 52 BPM

## 2017-08-25 VITALS — HEART RATE: 59 BPM

## 2017-08-25 VITALS — HEART RATE: 48 BPM

## 2017-08-25 VITALS — HEART RATE: 46 BPM

## 2017-08-25 LAB
ERYTHROCYTE [DISTWIDTH] IN BLOOD BY AUTOMATED COUNT: 14.5 % (ref 11.6–17.2)
HCT VFR BLD CALC: 44 % (ref 39–51)
HDLC SERPL-MCNC: 29.4 MG/DL (ref 40–60)
LDLC SERPL-MCNC: 68 MG/DL (ref 0–99)
MCH RBC QN AUTO: 28.7 PG (ref 27–34)
MCHC RBC AUTO-ENTMCNC: 31.9 % (ref 32–36)
MCV RBC AUTO: 90 FL (ref 80–100)
PLATELET # BLD: 196 TH/MM3 (ref 150–450)
RBC # BLD AUTO: 4.89 MIL/MM3 (ref 4.5–5.9)
REVIEW FLAG: (no result)
WBC # BLD AUTO: 11 TH/MM3 (ref 4–11)

## 2017-08-25 RX ADMIN — NITROGLYCERIN SCH INCH: 20 OINTMENT TOPICAL at 05:22

## 2017-08-25 RX ADMIN — ENOXAPARIN SODIUM SCH MG: 60 INJECTION SUBCUTANEOUS at 05:00

## 2017-08-25 RX ADMIN — NITROGLYCERIN SCH INCH: 20 OINTMENT TOPICAL at 00:00

## 2017-08-25 RX ADMIN — FAMOTIDINE SCH MG: 20 TABLET, FILM COATED ORAL at 09:00

## 2017-08-25 RX ADMIN — CARVEDILOL SCH MG: 3.12 TABLET, FILM COATED ORAL at 09:00

## 2017-08-25 RX ADMIN — NITROGLYCERIN SCH INCH: 20 OINTMENT TOPICAL at 11:20

## 2017-08-25 RX ADMIN — PHENYTOIN SODIUM SCH MLS/HR: 50 INJECTION INTRAMUSCULAR; INTRAVENOUS at 10:48

## 2017-08-25 NOTE — MA
cc:

BRAD BURNETT

****

 

 

DATE:

08/25/2017

 

PHYSICIAN:

Brad Burnett MD

 

PROCEDURE PERFORMED

Fluoroscopy interpretation.

left heart catheterization 01/03

Coronary angiography.

 

METHOD:

The risks, benefits and alternatives discussed with the patient.  The patient

understood and consented to the procedure.  The patient brought to the

catheterization lab and was placed on the catheterization table.  Right wrist

was prepped and draped in sterile fashion.  Right wrist was anesthetized 2%

lidocaine.  Right radial artery is cannulated 6-Amharic 7 cm sheath was placed

out difficulty.  200 mcg intra-arterial nitroglycerin addition of 2000 units of

intravenous heparin was administered.

 

LEFT HEART CATHETERIZATION:

6-Amharic JR-5 catheter advanced across the aortic valve without difficulty.

Intraventricular hemodynamics were measured 112 over 10 mmHg

 

Left ventricular end-diastolic pressure of 10 mmHg.  No significant aortic

stenosis by transaortic valve pullback gradient.

 

CORONARY ANGIOGRAPHY:

1.  Left main coronary angiographically normal.

2.  Left anterior descending coronary is a 40% stenosis at the bifurcation of a

moderate-sized diagonal branch remainder of the vessel has minor

irregularities.

3.  Left circumflex a dominant vessel giving rise to left-sided posterior

descending branch.  Ramus intermedius branch is present has mild luminal

irregularities proximally remainder of the circumflex has minor irregularities.

 

4.  Right coronaries nondominant, has minor luminal irregularities.

 

 

CONCLUSION

1. Mild to moderate nonobstructive mid left anterior descending coronary

     stenosis.

2. Normal left-sided filling pressures.

 

PLAN

Monitor the patient closely for any post procedural complications.  Hemo band

was applied.  We will add a long-acting nitrate, he may have small vessel

disease.  He can follow up as an outpatient with his primary care physician for

noncardiac workup.

 

 

 

 

                              _________________________________

                              MD CRUZITO Merchant/bryson

D:  8/25/2017/2:19 PM

T:  8/25/2017/5:43 PM

Visit #:  [id number]

Job #:  [id number]

NEETA

## 2017-08-25 NOTE — HHI.PR
Subjective


Subjective Remarks


no cp


+ cough, no sputum


no sob


wants to get up and walk around


tele reviewed, SB


no dizziness


no palpitations





Review of Systems


Constitutional


Constitutional Remarks


12 point ros completed, negative except as noted above





Vitals/Results


Vital Signs





Vital Signs








  Date Time  Temp Pulse Resp B/P (MAP) Pulse Ox O2 Delivery O2 Flow Rate FiO2


 


8/25/17 07:18 97.8 76 16 106/57 (73) 98   


 


8/25/17 04:20  48      


 


8/25/17 03:55 97.7 57 20 102/63 (76) 99   


 


8/25/17 00:38 97.8 57 20 125/58 (80) 96   


 


8/25/17 00:15  59      


 


8/24/17 20:25  61      


 


8/24/17 19:56 97.9 60 18 108/58 (75) 98   


 


8/24/17 16:14 97.8 62 16 138/71 (93) 98   


 


8/24/17 14:00 98.1 66 16 116/75 (89) 98   


 


8/24/17 12:30  88 16 121/76 (91) 98   


 


8/24/17 11:43  100 16 115/61 (79) 98   


 


8/24/17 11:35  100 16 137/71 (93) 98   


 


8/24/17 11:27  100 16 134/94 (107) 98   


 


8/24/17 11:18  114 18  97 Room Air  


 


8/24/17 11:13 97.7 110 16 144/78 (100) 98   








CBC/BMP:  


8/25/17 0403                                                                   

             8/24/17 1150





Lab Results





Laboratory Tests








Test


  8/24/17


11:50 8/24/17


13:38 8/24/17


20:00 8/25/17


04:03


 


White Blood Count 17.7 TH/MM3    11.0 TH/MM3 


 


Red Blood Count 5.60 MIL/MM3    4.89 MIL/MM3 


 


Hemoglobin 16.5 GM/DL    14.0 GM/DL 


 


Hematocrit 49.4 %    44.0 % 


 


Mean Corpuscular Volume 88.2 FL    90.0 FL 


 


Mean Corpuscular Hemoglobin 29.4 PG    28.7 PG 


 


Mean Corpuscular Hemoglobin


Concent 33.3 % 


  


  


  31.9 % 


 


 


Red Cell Distribution Width 14.3 %    14.5 % 


 


Platelet Count 235 TH/MM3    196 TH/MM3 


 


Mean Platelet Volume 9.1 FL    9.2 FL 


 


Neutrophils (%) (Auto) 86.1 %    


 


Lymphocytes (%) (Auto) 8.5 %    


 


Monocytes (%) (Auto) 4.6 %    


 


Eosinophils (%) (Auto) 0.6 %    


 


Basophils (%) (Auto) 0.2 %    


 


Neutrophils # (Auto) 15.3 TH/MM3    


 


Lymphocytes # (Auto) 1.5 TH/MM3    


 


Monocytes # (Auto) 0.8 TH/MM3    


 


Eosinophils # (Auto) 0.1 TH/MM3    


 


Basophils # (Auto) 0.0 TH/MM3    


 


CBC Comment DIFF FINAL    


 


Differential Comment     


 


Prothrombin Time 10.7 SEC    


 


Prothromb Time International


Ratio 1.0 RATIO 


  


  


  


 


 


Activated Partial


Thromboplast Time 25.9 SEC 


  


  


  


 


 


Blood Urea Nitrogen 7 MG/DL    


 


Creatinine 1.15 MG/DL    


 


Random Glucose 82 MG/DL    


 


Total Protein 7.1 GM/DL    


 


Albumin 3.5 GM/DL    


 


Calcium Level 8.8 MG/DL    


 


Alkaline Phosphatase 115 U/L    


 


Aspartate Amino Transf


(AST/SGOT) 21 U/L 


  


  


  


 


 


Alanine Aminotransferase


(ALT/SGPT) 29 U/L 


  


  


  


 


 


Total Bilirubin 0.6 MG/DL    


 


Sodium Level 142 MEQ/L    


 


Potassium Level 4.0 MEQ/L    


 


Chloride Level 111 MEQ/L    


 


Carbon Dioxide Level 21.4 MEQ/L    


 


Anion Gap 10 MEQ/L    


 


Estimat Glomerular Filtration


Rate 68 ML/MIN 


  


  


  


 


 


Troponin I


  LESS THAN 0.02


NG/ML 


  LESS THAN 0.02


NG/ML LESS THAN 0.02


NG/ML


 


Lactic Acid Level  1.0 mmol/L   


 


Lipase  107 U/L   


 


Triglycerides Level    153 MG/DL 


 


Cholesterol Level    128 MG/DL 


 


LDL Cholesterol    68 MG/DL 


 


HDL Cholesterol    29.4 MG/DL 


 


Cholesterol/HDL Ratio    4.35 RATIO 








Microbiology





 Microbiology


8/24/17 Aerobic Blood Culture, Received


          Pending


8/24/17 Anaerobic Blood Culture, Received


          Pending


8/24/17 Aerobic Blood Culture, Received


          Pending


8/24/17 Anaerobic Blood Culture, Received


          Pending


8/24/17 Influenza Types A,B Antigen (MIKAYLA) - Final, Complete


          NEGATIVE FOR FLU A AND B ANTIGEN....





Physical Exam


General


General Appearance:  Well Developed, Well Nourished, No Acute Distress, 

Comfortable





Eyes


Eye Exam:  Pupils Equal, Pupils Reactive





Ears & Nose


Ears & Nose Exam:  Nasal Mucosa Pink





Throat


Throat Exam:  Oral Mucosa Pink & Moist





Neck


Neck Exam:  Neck Supple, Trachea Midline





Pulmonary


Resp Exam:  Decreased Bases


Resp Remarks


exp. wheezes





Cardiology


CV Exam:  Bradycardia





Gastrointestinal/Abdomen


GI Exam:  Soft, Non-Tender, Bowel Sounds Present, Non-Distended





Musculoskeletal


MS Exam:  Joints Intact





Integumentary


Skin Exam:  Warm, Dry





Extremeties


Extremities Exam:  No Edema, Pedal Pulses Palpable





Neurologic


Neuro Exam:  Alert, Awake, Oriented, Speech Clear, Moving All Extremities, No 

Focal Deficits





Psychiatric


Psych Exam:  Appropriate Responses





VTE Prophylaxis


VTE Prophylaxis Meds:  Lovenox





Assessment/Plan


Problem List:  


(1) Chest pain


ICD Codes:  R07.9 - Chest pain, unspecified


Status:  Acute


(2) CAD (coronary artery disease)


ICD Codes:  I25.10 - Atherosclerotic heart disease of native coronary artery 

without angina pectoris


Status:  Acute


(3) Hyperlipidemia


ICD Codes:  E78.5 - Hyperlipidemia, unspecified


Status:  Chronic


(4) Leukocytosis


ICD Codes:  D72.829 - Elevated white blood cell count, unspecified


Status:  Acute


(5) Tobacco abuse


ICD Codes:  Z72.0 - Tobacco use


Status:  Acute


(6) Hx of myocardial infarction


ICD Codes:  I25.2 - Old myocardial infarction


Status:  Chronic


(7) HTN (hypertension)


ICD Codes:  I10 - Essential (primary) hypertension


Status:  Chronic


Assessment/Plan


47-year-old with history of coronary artery disease, presented to the emergency 

room with symptoms of dyspnea with exertion and chest pressure.  Initial 

troponin negative.





Chest pain, rule out acute coronary syndrome in patient with known coronary 

artery disease


-Continue with serial troponin-negative 


-Consult cardiology for evaluation, pending 


-Continue with aspirin, carvedilol, statin


-Nitroglycerin ointment 1 inch every 6


-Lovenox 60 mg subcutaneous every 12





History of cardiomyopathy, reports dyspnea with exertion.  Last echocardiogram 

in 2013 showed EF of 45-50%


-2-D echo has been ordered





Hypertension


BP actually trending low 100s, asymptomatic 


-Continue home medications





Hyperlipidemia


-Lipid profile results noted 


-Continue with pravastatin





Leukocytosis, etiology unclear, possibly secondary to stress response.  No fever

, no cough, no sputum production


-stop IVF


-WBC normal, no fever. 





Tobacco abuse


prob COPD


-counselled about smoking


-Duonebs PRN 








Lovenox For DVT prophylaxis


Pepcid for GI prophylaxis





NPO until card sees


f/u echo report


if no work up recommended, poss dc today 





D/W RN


D/W Dr. Morrow


D/W pt


This patient was seen by myself and Dr. Morrow, this note  is written on his 

behalf





Problem Qualifiers





(1) Chest pain:  


Qualified Codes:  R07.9 - Chest pain, unspecified


(2) CAD (coronary artery disease):  


Qualified Codes:  I25.118 - Atherosclerotic heart disease of native coronary 

artery with other forms of angina pectoris


(3) Hyperlipidemia:  


Qualified Codes:  E78.5 - Hyperlipidemia, unspecified


(4) Leukocytosis:  


Qualified Codes:  D72.829 - Elevated white blood cell count, unspecified


(5) HTN (hypertension):  


Qualified Codes:  I10 - Essential (primary) hypertension








Joan Alex Aug 25, 2017 08:10

## 2017-08-25 NOTE — EKG
Date Performed: 08/24/2017       Time Performed: 11:24:26

 

PTAGE:      47 years

 

EKG:      Sinus rhythm 

 

 NONSPECIFIC T-WAVE ABNORMALITY BORDERLINE ECG Compared to prior tracing no significant change 

 

 PREVIOUS TRACING               04/24/2014   [phone]

 

DOCTOR:   Kunal Norwood  Interpretating Date/Time  08/25/2017 11:45:02

## 2017-08-25 NOTE — PD.CONS
[facility] [place]


Service


CV


Consult Requested By





Reason for Consult


chest pain


Primary Care Physician


Vesta Dasilva D.O.


History of Present Illness


Here with nonobstructive CAD, s/p MI x 2013, HTN  and hyperlipidemia admitted 

for chest pain. He states he was awoken with retrosternal chest pressure. It 

lasted about an hour. It was relieved with SL NTG, It was associated with 

diaphoresis. He smokes 1 PPD cigarettes and there is no family history of 

premature cardiac disease in his family





Review of Systems


Consitutional:  DENIES: Fatigue, Fever, Chills, Weight gain, Weight loss


Eyes:  DENIES: Amaurosis Fugax, Change in vision


HEENT:  DENIES: Lightheadedness, Change in hearing


Respiratory:  DENIES: See HPI, Cough, Snoring, Shortness of breath, Wheezing, 

Sputum production


Cardiovascular:  COMPLAINS OF: See HPI


Gastrointestinal:  DENIES: Nausea, Vomiting, Change in bowel habits, Reflux, 

Bloody stools, Melena


Genitourinary:  DENIES: Urinary incontinence, Difficulty voiding


Integumentary:  DENIES: Rash


Neurologic:  DENIES: Tingling or numbness, Memory problems, Poor Balance, 

Stroke symptoms


Musculoskeletal:  DENIES: Joint pain, Muscle pain, Limited range of motion, 

Back pain


Psychiatric:  DENIES: Anxiety, Depression, Sleep disturbances


Hematologic:  DENIES: Bruising tendencies, Bleeding tendencies


Endocrine:  DENIES: Weight gain, Weight loss, Thyroid disease





Past Family Social History


Allergies:  


Coded Allergies:  


     No Known Allergies (Verified , 1/4/17)


Past Medical History


see HPI 


Cardiomyopathy, echo in 2013 showed EF of 45-50%, treated medically


Irregular heartbeat


Hyperlipidemia


Possible COPD


Tobacco abuse


Past Surgical History


see HPI 


Cholecystectomy


Reported Medications





Reported Meds & Active Scripts


Active


Active Ordered Medications





Current Medications








 Medications


  (Trade)  Dose


 Ordered  Sig/Yaneth


 Route  Start Time


 Stop Time Status Last Admin


 


  (Aspirin)  325 mg  DAILY


 PO  8/25/17 09:00


    8/25/17 09:00


 


 


  (Coreg)  3.125 mg  Q12HR


 PO  8/24/17 21:00


     


 


 


  (Nitroglycerin


 2% Oint)  1 inch  Q6HR


 TOPICAL  8/24/17 18:00


    8/24/17 17:37


 


 


  (Pravachol)  40 mg  DAILY


 PO  8/25/17 09:00


    8/25/17 09:00


 


 


  (Lovenox Inj)  60 mg  Q12H


 SQ  8/24/17 17:00


    8/24/17 17:37


 


 


  (Pepcid)  20 mg  BID


 PO  8/24/17 21:00


    8/25/17 09:00


 


 


 Sodium Chloride  1,000 ml @ 


 50 mls/hr  Q20H


 IV  8/24/17 16:00


    8/24/17 17:37


 








Family History


see HPI


Social History


see HPI 


drinks 1 beer a day


denies illicit drugs, yet drug screen is positive for marijuana





Physical Exam


Vital Signs





Vital Signs








  Date Time  Temp Pulse Resp B/P (MAP) Pulse Ox O2 Delivery O2 Flow Rate FiO2


 


8/25/17 07:18 97.8 76 16 106/57 (73) 98   


 


8/25/17 04:20  48      


 


8/25/17 03:55 97.7 57 20 102/63 (76) 99   


 


8/25/17 00:38 97.8 57 20 125/58 (80) 96   


 


8/25/17 00:15  59      


 


8/24/17 20:25  61      


 


8/24/17 19:56 97.9 60 18 108/58 (75) 98   


 


8/24/17 16:14 97.8 62 16 138/71 (93) 98   


 


8/24/17 14:00 98.1 66 16 116/75 (89) 98   


 


8/24/17 12:30  88 16 121/76 (91) 98   


 


8/24/17 11:43  100 16 115/61 (79) 98   


 


8/24/17 11:35  100 16 137/71 (93) 98   


 


8/24/17 11:27  100 16 134/94 (107) 98   


 


8/24/17 11:18  114 18  97 Room Air  


 


8/24/17 11:13 97.7 110 16 144/78 (100) 98   








Physical Exam


GENERAL: Well-nourished, well-developed patient in no apparent distress.


NECK: No JVD. No carotid bruit.


CARDIOVASCULAR: Regular rate and rhythm.  S1/S2 no murmur, rub, or gallop.


RESPIRATORY: No accessory muscle use. Clear to auscultation. Breath sounds 

equal bilaterally. 


GASTROINTESTINAL: Abdomen soft, non-tender, nondistended. 


MUSCULOSKELETAL: Extremities without clubbing, cyanosis, or edema.


Laboratory





Laboratory Tests








Test


  8/24/17


11:50 8/24/17


13:38 8/24/17


20:00 8/25/17


04:03


 


White Blood Count 17.7    11.0 


 


Red Blood Count 5.60    4.89 


 


Hemoglobin 16.5    14.0 


 


Hematocrit 49.4    44.0 


 


Mean Corpuscular Volume 88.2    90.0 


 


Mean Corpuscular Hemoglobin 29.4    28.7 


 


Mean Corpuscular Hemoglobin


Concent 33.3 


  


  


  31.9 


 


 


Red Cell Distribution Width 14.3    14.5 


 


Platelet Count 235    196 


 


Mean Platelet Volume 9.1    9.2 


 


Neutrophils (%) (Auto) 86.1    


 


Lymphocytes (%) (Auto) 8.5    


 


Monocytes (%) (Auto) 4.6    


 


Eosinophils (%) (Auto) 0.6    


 


Basophils (%) (Auto) 0.2    


 


Neutrophils # (Auto) 15.3    


 


Lymphocytes # (Auto) 1.5    


 


Monocytes # (Auto) 0.8    


 


Eosinophils # (Auto) 0.1    


 


Basophils # (Auto) 0.0    


 


CBC Comment DIFF FINAL    


 


Differential Comment     


 


Prothrombin Time 10.7    


 


Prothromb Time International


Ratio 1.0 


  


  


  


 


 


Activated Partial


Thromboplast Time 25.9 


  


  


  


 


 


Blood Urea Nitrogen 7    


 


Creatinine 1.15    


 


Random Glucose 82    


 


Total Protein 7.1    


 


Albumin 3.5    


 


Calcium Level 8.8    


 


Alkaline Phosphatase 115    


 


Aspartate Amino Transf


(AST/SGOT) 21 


  


  


  


 


 


Alanine Aminotransferase


(ALT/SGPT) 29 


  


  


  


 


 


Total Bilirubin 0.6    


 


Sodium Level 142    


 


Potassium Level 4.0    


 


Chloride Level 111    


 


Carbon Dioxide Level 21.4    


 


Anion Gap 10    


 


Estimat Glomerular Filtration


Rate 68 


  


  


  


 


 


Troponin I LESS THAN 0.02   LESS THAN 0.02  LESS THAN 0.02 


 


Lactic Acid Level  1.0   


 


Lipase  107   


 


Triglycerides Level    153 


 


Cholesterol Level    128 


 


LDL Cholesterol    68 


 


HDL Cholesterol    29.4 


 


Cholesterol/HDL Ratio    4.35 














 Date/Time


Source Procedure


Growth Status


 


 


 8/24/17 13:43


Blood Peripheral Aerobic Blood Culture


Pending Received


 


 8/24/17 13:43


Blood Peripheral Anaerobic Blood Culture


Pending Received


 


 8/24/17 13:44


Nasal Washing Influenza Types A,B Antigen (MIKAYLA) - Final


NEGATIVE FOR FLU A AND B ANTIGEN.... Complete








Result Diagram:  


8/25/17 0403                                                                   

             8/24/17 1150








Assessment and Plan


Problem List:  


(1) CAD (coronary artery disease)


ICD Codes:  I25.10 - Atherosclerotic heart disease of native coronary artery 

without angina pectoris


Status:  Acute


(2) Hyperlipidemia


ICD Codes:  E78.5 - Hyperlipidemia, unspecified


Status:  Chronic


(3) HTN (hypertension)


ICD Codes:  I10 - Essential (primary) hypertension


Status:  Chronic


(4) Hx of myocardial infarction


ICD Codes:  I25.2 - Old myocardial infarction


Status:  Chronic


Assessment and Plan


Cardiac evaluation so far is negative. His symptoms are worrisome for angina. 

That and his previous history is why we will schedule coronary angiogram and go 

from there


His LDL is controlled


His blood pressure is well controlled





Problem Qualifiers





(1) CAD (coronary artery disease):  


Qualified Codes:  I25.118 - Atherosclerotic heart disease of native coronary 

artery with other forms of angina pectoris


(2) Hyperlipidemia:  


Qualified Codes:  E78.5 - Hyperlipidemia, unspecified


(3) HTN (hypertension):  


Qualified Codes:  I10 - Essential (primary) hypertension








Dakota Harrison Aug 25, 2017 09:56

## 2017-08-25 NOTE — HHI.DCPOC
Discharge Care Plan


Diagnosis:  


(1) Chest pain








Your Health Problems Are: Chest Pain








Goals to Promote Your Health


* To prevent worsening of your condition and complications


* To maintain your health at the optimal level


Directions to Meet Your Goals


*** Take your medications as prescribed


*** Follow your dietary instruction


*** Follow activity as directed








*** Keep your appointments as scheduled


*** Take your immunizations and boosters as scheduled


*** If your symptoms worsen call your PCP, if no PCP go to Urgent Care Center 

or Emergency Room***


*** Smoking is Dangerous to Your Health. Avoid second hand smoke***


***Call the 24-hour hour crisis hotline for domestic abuse at [phone]***











Joan Alex Aug 25, 2017 17:03

## 2017-08-25 NOTE — ECHRPT
Indication:   chest pain

 

 CONCLUSIONS

 Normal left ventricular size. 

 Wall thickness is normal. 

 The left ventricular systolic function is reduced with an estimated ejection fraction in the range o
f 35-40%. 

 Mitral annular calcification is present. 

 The pulmonary valve is not well visualized.

 

 BP:  102   / 63      HR: 57                       Rhythm:           Sinus

 

 MEASUREMENTS  (Male / Female) Normal Values       Technical Quality:Fair

 2D ECHO

 LV Diastolic Diameter PLAX        4.4 cm                4.2 - 5.9 / 3.9 - 5.3 cm

 LV Systolic Diameter PLAX         3.8 cm                

 IVS Diastolic Thickness           1.1 cm                0.6 - 1.0 / 0.6 - 0.9 cm

 LVPW Diastolic Thickness          0.8 cm                0.6 - 1.0 / 0.6 - 0.9 cm

 LV Relative Wall Thickness        0.4                   

 LA Systolic Diameter LX           3.3 cm                3.0 - 4.0 / 2.7 - 3.8 cm

 

 DOPPLER

 AV Peak Velocity                  128.0 cm/s            

 AV Peak Gradient                  6.6 mmHg              

 LVOT Peak Velocity                70.1 cm/s             

 LVOT Peak Gradient                2.0 mmHg              

 Mitral E Point Velocity           77.5 cm/s             

 Mitral A Point Velocity           54.8 cm/s             

 Mitral E to A Ratio               1.4                   

 TR Peak Velocity                  114.0 cm/s            

 TR Peak Gradient                  5.2 mmHg              

 

 

 FINDINGS

 

 LEFT VENTRICLE

 Normal left ventricular size. 

 Wall thickness is normal. 

 The left ventricular systolic function is moderate-to-severly reduced with an estimated ejection fra
ction in 

 the range of 35-40%.

 

 RIGHT VENTRICLE

 Normal right ventricular size and systolic function.

 

 LEFT ATRIUM

 The left atrial size is normal.

 

 RIGHT ATRIUM

 The right atrial size is normal.

 

 ATRIAL SEPTUM

 Normal atrial septal thickness without atrial level shunting by limited color doppler interrogation.


 

 AORTA

 The aortic root and proximal ascending aorta are normal in size on limited imaging.

 

 MITRAL VALVE

 Mitral annular calcification is present.

 

 AORTIC VALVE

 Trileaflet aortic valve. No aortic valve stenosis or regurgitation.

 

 TRICUSPID VALVE

 Structurally normal tricuspid valve. No tricuspid valve stenosis or regurgitation.

 

 PULMONARY VALVE

 The pulmonary valve is not well visualized.

 

 VESSELS

 The inferior vena cava is normal in size.

 

 PERICARDIUM

 No pericardial effusion.

 

 

 

 

  Gianni Mckeon MD

  (Electronically Signed)

  Final Date:25 August 2017 15:45

## 2017-08-25 NOTE — CATHPROC
[facility] Report

Study Information

Study Number    Admission             Scheduled Start               Study Start

 

[id number]     Aug 24 2017 1:44PM        08/25/2017                  Aug 25 2017 12:59PM

 

Universal Service

 

Cardiac Catheterization

 

Admit Source                Facility Department

 

Emergency department            [facility] - Cath Lab

 

Physician and Clinical Staff

Initial Brad Moreno           Circulator       Yelena Paris,RN

 

                           Recorder        Jhonatan Nguyen,RT(R)

 

                           Scrub          Elle Zimmer,RT(R) (BS)

 

Procedures Performed

Procedure                       Location (Site)             Vessel Name

 

Coronary Angiograms                   RCA                  Right Coronary

 

 

 

Equipment

Time            Description             Size           Mfg Part Number  Used/Scraped

                   TRANSDUCER, TRUWAVE                     [id number]

13:28    MASON [employer]                        *                     Used

                   W/STOCKCOCK                         *[id number]

                                                  [phone]



                                                  *[id number]

                                                  [phone]



                                                  *[id number]

                                                  [id number]

13:28    [employer]    PACK, CCL CUSTOM          *                     Used

                                                  *[id number]

13:28    [employer]    SUPPORT, ARTERIAL ADULT                   [id number] *[id number] Used

                                                  [id number]

13:28    [employer]      PEN, SKIN DUAL W/ RULER       *                     Used

                                                  *[id number]

                   BAND, RADIAL COMPRESSION TR                 [id number]

14:03    [employer]                        24CM                    Used

                   SHORT 24                           *[id number]

                   SHEATH, FR6 RADIAL PRELUDE

13:28    [employer]                        FR 6           [id number]   Used

                   EASE 11CM

                                                  [id number]

13:28    [employer]      WIRE, EXCHANGE 260CM 3MMJ      260CM                   Used

                                                  *[id number]

13:28    [employer]         OMNIPAQUE, 350 MG, 150ML      150ML          [id number]      Used

                                                  [id number]

13:28    [employer]      BLANKET,WARM AIR CCL        *                     Used

                                                  *[id number]

 

History: Current Medications

Medication         Dosage/Unit       Route        Frequency     Last Date/Time Taken

 

Beta Blocker

 

LOVENOX

 

ASA

History: Allergies

Allergy                Reaction

 

No Known Allergies

 

 

History: Risk Factors

                        Family History of

Hypertension     Dyslipidemia                 Previous MI     Previous Heart Failure

                        Premature CAD

Yes         Yes            Yes         Yes         No

Prior Valve

           Prior PCI         Prior CABG

Surgery

No          No            No

           Cerebrovascular      Peripheral Artery  Chronic Lung

On Dialysis                                      Diabetes

           Disease          Disease       Disease

No          No            No         No         No

 

 

History: Symptoms/Diagnosis Selection Items

Chest pain

 

 

History: Stress Tests

Stress or Imaging Studies Performed

 

No

 

 

History: Other Disease Selection Items

HTN

 

 

History: Other

Current Smoker      Packs a Day        Years Used      Pack Years

 

Yes            1             15          15

 

Labs

Hgb (g/dl)        Hct (%)          WBC (l/cumm)     Platelets (thousands)

 

11.60-17.00        35.00-51.00        4.00-11.00      150..00

 

14.0           44            11          196

 

Glucose (mg/dl)      Creatinine (mg/dl)

74..00       0.50-1.30

 

82            1.1

 

Na (meq/l)        K (meq/l)

 

136..00       3.50-5.10

 

142            4

 

INR (PTT:PT)

 

0.90-1.10

 

1

 

Troponin I (ng/ml)    CPK-MB (ng/ML)

 

0.02-0.05         0.50-3.60

 

0.02           Not Drawn

Medication

Medication Total Dose (Bolus/Oral)

Medication              Total Dosage/Unit

 

1% XYLOCAINE                 5 mL

 

FENTANYL                  50 mcg

 

HEPARIN                  3000 units

 

NTG (IC)                  200 mcg

 

VERSED                    2 mg

 

Medications (Bolus/Oral)

Medication          Time Given           Dosage/Unit       Administered By       Reason

 

FENTANYL           8/25/2017 1:45:50 PM       50 mcg         Yelena Paris

50 mcg FENTANYL given in lab by Yelena Paris, RN in Right Antecubital via Peripheral IV. Ordered b
y Brad Mayers.

 

VERSED            8/25/2017 1:46:35 PM       2 mg         Yelena Paris

2 mg VERSED given in lab by Yelena Paris, RN in Right Antecubital via Peripheral IV. Ordered by Brad Devi.

 

1% XYLOCAINE         8/25/2017 1:49:58 PM       5 mL         Minor, Brad

5 mL 1% XYLOCAINE given in lab by Brad Mayers in Right Radial via Subcutaneous.

 

NTG (IC)           8/25/2017 1:52:08 PM      200 mcg         Brad Mayers

200 mcg NTG (IC) given in lab by Brad Mayers via Intra-arterial.

 

HEPARIN            8/25/2017 1:53:51 PM     3000 units        Yelena Paris

3000 units HEPARIN given in lab by Yelena Paris, RN via Peripheral IV. Ordered by Brad Mayers.

 

Medication (Drip)

Medication          Time Given           Dosage/Unit       Concentration/Unit  Diluent (ml)   Solutio
n

 

IV Solutions         8/25/2017 1:27:55 PM       0 mL (IV)                 500       NaCl .9

Patient arrived on IV Solutions in Right Antecubital via Peripheral IV. Pump/Drip Flow = 20 ml/hr usi
ng NaCl .9.

Initial Case Assessment

Cardiovascular

HR           Rhythm          NIBP           Chest Pain

 

57           nsr           136/82          0

 

Edema Present      Skin color            Skin

 

None           Normal              Warm Dry

 

Circulatory - Right Pulses

Dorsalis Pedis     Femoral         Radial

 

2            2            2

 

Scale (0,1,2,3,4,d)

 

Scale (0,1,2,3,4,d)

 

Neurological State

            Oriented to time-place-

Alert                      Moves all extremities

            person

 

Respiration - General

Respiration Rate

            SpO2 (%)

(B/min)

15           100

 

Final Case Assessment

Cardiovascular

HR           Rhythm          NIBP           Chest Pain

 

62           sr            131/86          0

 

Edema Present      Skin color            Skin

 

None           Normal              Warm Dry

 

Circulatory - Right Pulses

Dorsalis Pedis     Femoral         Radial

 

2            2            2

 

Scale (0,1,2,3,4,d)

 

Scale (0,1,2,3,4,d)

 

Neurological State

            Oriented to time-place-

Alert                      Moves all extremities

            person

 

Respiration - General

Respiration Rate

            SpO2 (%)         O2 (lpm)

(B/min)

18           99            0

 

Chronological Log

Time    Study Chronological Log

 

13:18:25  Patient arrived via Bed.

 

13:18:27  Patient Name, D.O.B, / Armband Verified By R.N.

 

13:18:28  Consent signed by the physician and the patient and verified by the Cath Lab staff.

13:18:29  Pre-op and post- op instructions given; patient acknowledges understanding of instructions.


 

13:27:39  Verbal Stimulation=2 Physical Stimulation=2 Airway=2 Respiration=2 TOTAL=8. (0=absent, 1=li
mited, 2=present)

 

13:27:40  Presedation assessment performed by Cath Lab RN.

 

13:27:41  Allens test performed on the right radial and ulnar artery. positive.

 

13:27:46  Immediate Presedation assesment performed by physician.

 

13:27:47  Patient has been NPO for More than 6Hrs.

 

13:27:47  Skin Breakdown- none per patient

      Vitals capture started with the following parameters, Patient=Adult, Interval=5 min, Initial Pr
dkwnop=098 mmHg,

13:27:48

      Deflation Rate=5 mmHg, Cuff placed on Left Arm

13:27:49  Reference ECG taken

 

13:27:52  Patient Warmer Placed on the Table.

 

13:27:52  Scottie Prominences Protected

 

13:27:54  A # 18 IV was noted in the Antecubital (right). Grade = 0

 

13:27:55  Patient arrived on IV Solutions in Right Antecubital via Peripheral IV. Pump/Drip Flow = 20
 ml/hr using NaCl .9.

 

13:27:55  History and physical on the chart or being dictated.

 

13:28:27  HR=52 bpm, QWCG=685/82 mmhg, SpO2=99.0 %, Resp=10 B/min, Pain=0, Jessica=10, Oliveros=2

      Assessment: Initial Case, HR=57 BPM, Rhythm=nsr, ZTOM=872/82 mmhg, Chest Pain=0, Edema=None, Co
janell=Normal,

      Skin = Warm, Dry

13:29:00  Right Pulses: Kahlil Ped=2, Femoral=2, Radial=2

      Neurological: State=Alert, Ox3, AREVALO

      Respiration: Resp=15 B/min, RlK2=909 %

13:33:26  HR=50 bpm, TQOK=714/76 mmhg, GaE1=384.0 %, Resp=20 B/min, Pain=0, Jessica=10, Oliveros=2

 

13:33:58  Right Radial and groin(s) prepped with 2% chlorhexidine, and with a 3 min. waiting time.

 

13:38:23  HR=58 bpm, TYEQ=511/82 mmhg, IcP8=074.0 %, Resp=16 B/min, Pain=0, Jessica=10, Oliveros=2

 

13:40:45  MD paged

 

13:41:21  MD responded

 

13:41:24  MD notified again

 

13:43:26  HR=50 bpm, XBNL=556/79 mmhg, PdB9=062.0 %, Resp=16 B/min, Pain=0, Jessica=10, Oliveros=2

 

13:45:17  MD arrived.

 

13:45:25  Contrast Scanned

 

13:45:26  Immediate Presedation assesment performed by physician.

      50 mcg FENTANYL given in lab by Yelena Paris, RN in Right Antecubital via Peripheral IV. Ord
ered by Talib

13:45:50

      Brad.

13:46:34  Pressure channel 2 zeroed.

 

13:46:35  2 mg VERSED given in lab by Yelena Paris, RN in Right Antecubital via Peripheral IV. Ord
ered by Brad Mayers.

 

13:48:29  HR=60 bpm, RKSS=491/71 mmhg, SpO2=99.0 %, Resp=14 B/min, Oliveros=2

      Time Out. Correct patient, correct procedure,correct physician, ,power injector not loaded with
 contrast with surgical

13:49:04

      team present. Time Out Concurred by MD, individual staff and CRNA in procedure. Not loaded at t
his time.

13:49:27  Presedation re-assessment performed by Cath Lab RN.

 

13:49:29  Case Start

 

13:49:48  Verbal Stimulation=2 Physical Stimulation=2 Airway=2 Respiration=2 TOTAL=8. (0=absent, 1=li
mited, 2=present)

 

13:49:58  5 mL 1% XYLOCAINE given in lab by Brad Mayers in Right Radial via Subcutaneous.

 

13:51:31  Access site was Radial Artery. right art

      A SHEATH, FR6 RADIAL PRELUDE EASE 11CM FR 6 was advanced into the Radial (right) using the Perc
utaneous

13:51:46

      technique.

13:52:08  200 mcg NTG (IC) given in lab by Brad Mayers via Intra-arterial.

 

13:53:26  HR=61 bpm, EQES=340/71 mmhg, SpO2=97.0 %, Resp=14 B/min, Oliveros=2

 

13:53:51  3000 units HEPARIN given in lab by Yelena Paris, RN via Peripheral IV. Ordered by Brad Mayers.

      Recorded Pressure: LV, Ao, HR=77, Condition=Condition 1

13:54:03  (Left Ventricle) /7/16,

      (Aorta) Ao 111/76/91

      A JR 5.0 INFINITI CATHETER FR 6 was advanced over a wire. OMNIPAQUE, 350 MG, 150ML 150ML was us
ed for

13:54:17

      injections.

13:56:16  The  RCA was injected and visualized at various angles. OMNIPAQUE, 350 MG, 150ML 150ML used
.

 

13:58:25  HR=55 bpm, DCZG=731/72 mmhg, SpO2=97.0 %, Resp=12 B/min, Oliveros=2

      After removing the current catheter a JL 3.5 INFINITI CATHETER FR 6 was advanced over a WIRE, E
XCHANGE 260CM

13:59:16

      3MMJ 260CM.

      Recorded Pressure: Ao, HR=52, Condition=Condition 1

14:00:44

      (Aorta) Ao 134/76/98

14:01:06  Catheter was removed OTW

 

14:01:17  Case End

 

14:03:24  HR=59 bpm, MLQV=609/86 mmhg, SpO2=98.0 %, Resp=13 B/min, Oliveros=2

      Assessment: Final Case, HR=62 BPM, Rhythm=sr, RBGL=829/86 mmhg, Chest Pain=0, Edema=None, Color
=Normal,

      Skin = Warm, Dry

14:04:01  Right Pulses: Kahlil Ped=2, Femoral=2, Radial=2

      Neurological: State=Alert, Ox3, AREVALO

      Respiration: Resp=18 B/min, SpO2=99 %, O2=0 lpm

14:04:45  Catheter(s) removed without difficulty

      Radial Compression Device Used. 13 mLs of air placed in BAND, RADIAL COMPRESSION TR SHORT 24 24
CM. Affected

14:04:51

      hand 99 % O2 saturation.

14:05:04  No case complications noted.

 

14:05:06  Cine recording checked.

 

14:05:09  Bedside Report will be given.

 

14:08:23  HR=56 bpm, PRQS=111/97 mmhg, SpO2=97.0 %, Resp=18 B/min, Oliveros=2

 

14:08:48  Vitals capture stopped.

 

14:09:54  Patient moved to [facility]

 

 

 

 

End Study - Contrast Media Used In Study

Contrast      Total Opened (mL)    Total Used (mL)       Total Wasted (mL)

 

Omnipaque      25           25              0

 

End Study - Maximum Contrast Load

Max Contrast Load (mL)

 

381.8

End Study - Radiation Exposure

Fluoro Time

(minutes)

1.0

 

 

End Study - Patient Disposition

Complications  Transferred To

 

       Telemetry Bed

## 2018-06-26 ENCOUNTER — HOSPITAL ENCOUNTER (EMERGENCY)
Dept: HOSPITAL 17 - NEPD | Age: 48
Discharge: HOME | End: 2018-06-26
Payer: SELF-PAY

## 2018-06-26 VITALS
OXYGEN SATURATION: 98 % | DIASTOLIC BLOOD PRESSURE: 74 MMHG | SYSTOLIC BLOOD PRESSURE: 127 MMHG | HEART RATE: 76 BPM | TEMPERATURE: 100.5 F | RESPIRATION RATE: 18 BRPM

## 2018-06-26 VITALS
HEART RATE: 106 BPM | SYSTOLIC BLOOD PRESSURE: 140 MMHG | DIASTOLIC BLOOD PRESSURE: 86 MMHG | RESPIRATION RATE: 19 BRPM | TEMPERATURE: 100.4 F | OXYGEN SATURATION: 99 %

## 2018-06-26 VITALS — OXYGEN SATURATION: 98 %

## 2018-06-26 VITALS — WEIGHT: 202.83 LBS | HEIGHT: 72 IN | BODY MASS INDEX: 27.47 KG/M2

## 2018-06-26 DIAGNOSIS — R00.2: ICD-10-CM

## 2018-06-26 DIAGNOSIS — R07.9: ICD-10-CM

## 2018-06-26 DIAGNOSIS — E78.00: ICD-10-CM

## 2018-06-26 DIAGNOSIS — Z87.442: ICD-10-CM

## 2018-06-26 DIAGNOSIS — J45.909: ICD-10-CM

## 2018-06-26 DIAGNOSIS — R53.1: ICD-10-CM

## 2018-06-26 DIAGNOSIS — R11.2: ICD-10-CM

## 2018-06-26 DIAGNOSIS — T67.9XXA: Primary | ICD-10-CM

## 2018-06-26 DIAGNOSIS — I10: ICD-10-CM

## 2018-06-26 DIAGNOSIS — E86.0: ICD-10-CM

## 2018-06-26 DIAGNOSIS — F17.210: ICD-10-CM

## 2018-06-26 DIAGNOSIS — I25.2: ICD-10-CM

## 2018-06-26 LAB
ALBUMIN SERPL-MCNC: 3.8 GM/DL (ref 3.4–5)
ALP SERPL-CCNC: 85 U/L (ref 45–117)
ALT SERPL-CCNC: 21 U/L (ref 12–78)
AST SERPL-CCNC: 17 U/L (ref 15–37)
BASOPHILS # BLD AUTO: 0 TH/MM3 (ref 0–0.2)
BASOPHILS NFR BLD: 0.2 % (ref 0–2)
BILIRUB SERPL-MCNC: 0.4 MG/DL (ref 0.2–1)
BUN SERPL-MCNC: 9 MG/DL (ref 7–18)
CALCIUM SERPL-MCNC: 8.7 MG/DL (ref 8.5–10.1)
CHLORIDE SERPL-SCNC: 104 MEQ/L (ref 98–107)
COLOR UR: YELLOW
CREAT SERPL-MCNC: 1.11 MG/DL (ref 0.6–1.3)
EOSINOPHIL # BLD: 0 TH/MM3 (ref 0–0.4)
EOSINOPHIL NFR BLD: 0.1 % (ref 0–4)
ERYTHROCYTE [DISTWIDTH] IN BLOOD BY AUTOMATED COUNT: 13.9 % (ref 11.6–17.2)
GFR SERPLBLD BASED ON 1.73 SQ M-ARVRAT: 71 ML/MIN (ref 89–?)
GLUCOSE SERPL-MCNC: 93 MG/DL (ref 74–106)
GLUCOSE UR STRIP-MCNC: (no result) MG/DL
HCO3 BLD-SCNC: 22.6 MEQ/L (ref 21–32)
HCT VFR BLD CALC: 50.1 % (ref 39–51)
HGB BLD-MCNC: 16.9 GM/DL (ref 13–17)
HGB UR QL STRIP: (no result)
INR PPP: 1 RATIO
KETONES UR STRIP-MCNC: (no result) MG/DL
LYMPHOCYTES # BLD AUTO: 0.6 TH/MM3 (ref 1–4.8)
LYMPHOCYTES NFR BLD AUTO: 6.6 % (ref 9–44)
MCH RBC QN AUTO: 30.4 PG (ref 27–34)
MCHC RBC AUTO-ENTMCNC: 33.9 % (ref 32–36)
MCV RBC AUTO: 89.9 FL (ref 80–100)
MONOCYTE #: 0.7 TH/MM3 (ref 0–0.9)
MONOCYTES NFR BLD: 7.4 % (ref 0–8)
MUCOUS THREADS #/AREA URNS LPF: (no result) /LPF
NEUTROPHILS # BLD AUTO: 8.3 TH/MM3 (ref 1.8–7.7)
NEUTROPHILS NFR BLD AUTO: 85.7 % (ref 16–70)
NITRITE UR QL STRIP: (no result)
PLATELET # BLD: 152 TH/MM3 (ref 150–450)
PMV BLD AUTO: 9.1 FL (ref 7–11)
PROT SERPL-MCNC: 7.6 GM/DL (ref 6.4–8.2)
PROTHROMBIN TIME: 10.1 SEC (ref 9.8–11.6)
RBC # BLD AUTO: 5.57 MIL/MM3 (ref 4.5–5.9)
SODIUM SERPL-SCNC: 136 MEQ/L (ref 136–145)
SP GR UR STRIP: 1.01 (ref 1–1.03)
TROPONIN I SERPL-MCNC: (no result) NG/ML (ref 0.02–0.05)
URINE LEUKOCYTE ESTERASE: (no result)
WBC # BLD AUTO: 9.6 TH/MM3 (ref 4–11)

## 2018-06-26 PROCEDURE — 96361 HYDRATE IV INFUSION ADD-ON: CPT

## 2018-06-26 PROCEDURE — 93005 ELECTROCARDIOGRAM TRACING: CPT

## 2018-06-26 PROCEDURE — 99284 EMERGENCY DEPT VISIT MOD MDM: CPT

## 2018-06-26 PROCEDURE — 85610 PROTHROMBIN TIME: CPT

## 2018-06-26 PROCEDURE — 96374 THER/PROPH/DIAG INJ IV PUSH: CPT

## 2018-06-26 PROCEDURE — 82550 ASSAY OF CK (CPK): CPT

## 2018-06-26 PROCEDURE — 85025 COMPLETE CBC W/AUTO DIFF WBC: CPT

## 2018-06-26 PROCEDURE — 80053 COMPREHEN METABOLIC PANEL: CPT

## 2018-06-26 PROCEDURE — 85730 THROMBOPLASTIN TIME PARTIAL: CPT

## 2018-06-26 PROCEDURE — 81001 URINALYSIS AUTO W/SCOPE: CPT

## 2018-06-26 PROCEDURE — 83605 ASSAY OF LACTIC ACID: CPT

## 2018-06-26 PROCEDURE — 87804 INFLUENZA ASSAY W/OPTIC: CPT

## 2018-06-26 PROCEDURE — 84484 ASSAY OF TROPONIN QUANT: CPT

## 2018-06-26 NOTE — PD
HPI


Chief Complaint:  General Weakness


Time Seen by Provider:  18:08


Travel History


International Travel<30 days:  No


Contact w/Intl Traveler<30days:  No


Traveled to known affect area:  No





History of Present Illness


HPI


47-year-old male presents the ED for evaluation of approximate 24 hour history 

of generalized weakness, nausea, vomiting.  Patient states "I feel like I got 

beat up."  Patient builds trusses in the heat, states that his symptoms started 

after work yesterday.  He states that he felt feverish, hot and cold throughout 

the day.  He has distant history of MI and states that he has had intermittent, 

brief left-sided chest pain and palpitations.  Last episode sometime today.  No 

chest pain currently.  He endorses multiple episodes of loose stool today, 

states that they are green in color.  Endorses dark color to his urine.  

Patient did not receive this years flu vaccine.  He denies sick contacts.





PFSH


Past Medical History


Hx Anticoagulant Therapy:  Yes (ASA)


Arthritis:  No


Asthma:  Yes


Autoimmune Disease:  No


Blood Disorders:  No


Anxiety:  No


Depression:  No


Heart Rhythm Problems:  Yes (bradycardic )


Cancer:  No


Cardiac Catheterization:  Yes


Cardiovascular Problems:  Yes


High Cholesterol:  Yes


Chemotherapy:  No


Chest Pain:  Yes


Congestive Heart Failure:  No


COPD:  No


Cerebrovascular Accident:  No


Diabetes:  No


Diminished Hearing:  No


Endocrine:  No


Gastrointestinal Disorders:  No


GERD:  No


Glaucoma:  No


Genitourinary:  No


Headaches:  No


Hepatitis:  No


Hiatal Hernia:  No


Heparin Induced Thrombocytopen:  No


Hypertension:  Yes


Immune Disorder:  No


Implanted Vascular Access Dvce:  No


Kidney Stones:  Yes


Musculoskeletal:  No


Neurologic:  No


Psychiatric:  No


Reproductive:  No


Respiratory:  Yes


Immunizations Current:  Yes


Migraines:  No


Myocardial Infarction:  Yes (X 2)


Radiation Therapy:  No


Renal Failure:  No


Seizures:  No


Sickle Cell Disease:  No


Sleep Apnea:  No


Thyroid Disease:  No


Ulcer:  No


PNEUMOCCOCAL Vaccine (Year):  1





Past Surgical History


Abdominal Surgery:  Yes (gallbladder out)


AICD:  No


Appendectomy:  No


Arteriovenous Shunt:  No


Cardiac Surgery:  No


Cholecystectomy:  Yes


Coronary Artery Bypass Graft:  No


Ear Surgery:  No


Endocrine Surgery:  No


Eye Surgery:  No


Genitourinary Surgery:  No


Gynecologic Surgery:  No


Insulin Pump:  No


Joint Replacement:  No


Neurologic Surgery:  No


Pacemaker:  No


Thoracic Surgery:  No


Tonsillectomy:  Yes


Other Surgery:  Yes





Social History


Alcohol Use:  No (DENIES)


Tobacco Use:  Yes (9-10 CIGARETTES)


Substance Use:  No





Allergies-Medications


(Allergen,Severity, Reaction):  


Coded Allergies:  


     No Known Allergies (Verified  Adverse Reaction, Unknown, 6/26/18)


Reported Meds & Prescriptions





Reported Meds & Active Scripts


Active


Zofran (Ondansetron HCl) 4 Mg Tab 4 Mg PO Q6HR PRN


Coreg (Carvedilol) 3.125 Mg Tab 3.125 Mg PO Q12HR 30 Days


Isosorbide Mononitrate ER (Isosorbide Mononitrate) 30 Mg South 30 Mg PO DAILY@

07 30 Days








Review of Systems


Except as stated in HPI:  all other systems reviewed are Neg





Physical Exam


Narrative


GENERAL: Well-nourished, well-developed white male no acute distress.  


SKIN: Focused skin assessment warm/dry.  Dry mucous membranes.


HEAD: Normocephalic.


EYES: No scleral icterus. No injection or drainage. 


NECK: Supple, trachea midline. No JVD or lymphadenopathy.


CARDIOVASCULAR: Regular rate and rhythm without murmurs, gallops, or rubs. 


RESPIRATORY: Breath sounds clear and equal bilaterally. No accessory muscle use.


GASTROINTESTINAL: Abdomen soft, non-tender, nondistended.  Active bowel sounds.


MUSCULOSKELETAL: No cyanosis, or edema.  Moves extremities spontaneously.


BACK: Nontender without obvious deformity. No CVA tenderness.





Data


Data


Last Documented VS





Vital Signs








  Date Time  Temp Pulse Resp B/P (MAP) Pulse Ox O2 Delivery O2 Flow Rate FiO2


 


6/26/18 19:40 100.5 76 18 127/74 (91) 98 Room Air  








Orders





 Orders


Complete Blood Count With Diff (6/26/18 18:09)


Comprehensive Metabolic Panel (6/26/18 18:09)


Lactic Acid (6/26/18 18:09)


Prothrombin Time / Inr (Pt) (6/26/18 18:09)


Act Partial Throm Time (Ptt) (6/26/18 18:09)


Urinalysis - C+S If Indicated (6/26/18 18:09)


Iv Access Insert/Monitor (6/26/18 18:09)


Ecg Monitoring (6/26/18 18:09)


Oximetry (6/26/18 18:09)


Sodium Chlor 0.9% 1000 Ml Inj (Ns 1000 M (6/26/18 18:09)


Sodium Chloride 0.9% Flush (Ns Flush) (6/26/18 18:15)


Electrocardiogram (6/26/18 18:09)


Sodium Chlor 0.9% 1000 Ml Inj (Ns 1000 M (6/26/18 18:15)


Ondansetron  Odt (Zofran  Odt) (6/26/18 18:15)


Troponin I (6/26/18 18:09)


Ckmb (Isoenzyme) Profile (6/26/18 18:09)


Acetaminophen (Tylenol) (6/26/18 18:15)


Influenzae A/B Antigen (6/26/18 18:11)


Metoclopramide Inj (Reglan Inj) (6/26/18 19:45)


Ed Discharge Order (6/26/18 20:31)





Labs





Laboratory Tests








Test


  6/26/18


18:26 6/26/18


19:50


 


White Blood Count 9.6 TH/MM3  


 


Red Blood Count 5.57 MIL/MM3  


 


Hemoglobin 16.9 GM/DL  


 


Hematocrit 50.1 %  


 


Mean Corpuscular Volume 89.9 FL  


 


Mean Corpuscular Hemoglobin 30.4 PG  


 


Mean Corpuscular Hemoglobin


Concent 33.9 % 


  


 


 


Red Cell Distribution Width 13.9 %  


 


Platelet Count 152 TH/MM3  


 


Mean Platelet Volume 9.1 FL  


 


Neutrophils (%) (Auto) 85.7 %  


 


Lymphocytes (%) (Auto) 6.6 %  


 


Monocytes (%) (Auto) 7.4 %  


 


Eosinophils (%) (Auto) 0.1 %  


 


Basophils (%) (Auto) 0.2 %  


 


Neutrophils # (Auto) 8.3 TH/MM3  


 


Lymphocytes # (Auto) 0.6 TH/MM3  


 


Monocytes # (Auto) 0.7 TH/MM3  


 


Eosinophils # (Auto) 0.0 TH/MM3  


 


Basophils # (Auto) 0.0 TH/MM3  


 


CBC Comment DIFF FINAL  


 


Differential Comment   


 


Prothrombin Time 10.1 SEC  


 


Prothromb Time International


Ratio 1.0 RATIO 


  


 


 


Activated Partial


Thromboplast Time 26.8 SEC 


  


 


 


Blood Urea Nitrogen 9 MG/DL  


 


Creatinine 1.11 MG/DL  


 


Random Glucose 93 MG/DL  


 


Total Protein 7.6 GM/DL  


 


Albumin 3.8 GM/DL  


 


Calcium Level 8.7 MG/DL  


 


Alkaline Phosphatase 85 U/L  


 


Aspartate Amino Transf


(AST/SGOT) 17 U/L 


  


 


 


Alanine Aminotransferase


(ALT/SGPT) 21 U/L 


  


 


 


Total Bilirubin 0.4 MG/DL  


 


Sodium Level 136 MEQ/L  


 


Potassium Level 4.0 MEQ/L  


 


Chloride Level 104 MEQ/L  


 


Carbon Dioxide Level 22.6 MEQ/L  


 


Anion Gap 9 MEQ/L  


 


Estimat Glomerular Filtration


Rate 71 ML/MIN 


  


 


 


Lactic Acid Level 1.8 mmol/L  


 


Total Creatine Kinase 82 U/L  


 


Troponin I


  LESS THAN 0.02


NG/ML 


 


 


Urine Color  YELLOW 


 


Urine Turbidity  CLEAR 


 


Urine pH  6.0 


 


Urine Specific Gravity  1.012 


 


Urine Protein  NEG mg/dL 


 


Urine Glucose (UA)  NEG mg/dL 


 


Urine Ketones  NEG mg/dL 


 


Urine Occult Blood  NEG 


 


Urine Nitrite  NEG 


 


Urine Bilirubin  NEG 


 


Urine Urobilinogen  2.0 mg/dL 


 


Urine Leukocyte Esterase  NEG 


 


Urine Mucus  FEW /lpf 


 


Microscopic Urinalysis Comment


  


  CULT NOT


INDICATED











MDM


Medical Decision Making


Medical Screen Exam Complete:  Yes


Emergency Medical Condition:  Yes


Differential Diagnosis


Dehydration versus rhabdomyolysis versus metabolic derangement versus less 

likely ICH versus other


Narrative Course


47-year-old male presents the ED for evaluation of approximate 12 hour history 

of fevers, body aches, nausea, vomiting, diarrhea.  Patient's tachycardic and 

febrile on presentation.  Physical exam was unremarkable.  IV was established.  

Patient was administered p.o. Tylenol and Zofran and 2 L normal saline.  Lab 

work ordered and pending.  Patient signed out to Dominik Driscoll PA-C at end of 

shift.  Please see his note for disposition.


Scripts


Ondansetron (Zofran) 4 Mg Tab


4 MG PO Q6HR Y for NAUSEA OR VOMITING, #20 TAB 0 Refills


   Prov: Jose Lantigua MD         6/26/18











Sammie Chirinos Jun 26, 2018 18:42

## 2018-06-26 NOTE — PD
Data


Data


Last Documented VS





Vital Signs








  Date Time  Temp Pulse Resp B/P (MAP) Pulse Ox O2 Delivery O2 Flow Rate FiO2


 


6/26/18 15:28 100.4 106 19 140/86 (104) 99   








Orders





 Orders


Complete Blood Count With Diff (6/26/18 18:09)


Comprehensive Metabolic Panel (6/26/18 18:09)


Lactic Acid (6/26/18 18:09)


Prothrombin Time / Inr (Pt) (6/26/18 18:09)


Act Partial Throm Time (Ptt) (6/26/18 18:09)


Urinalysis - C+S If Indicated (6/26/18 18:09)


Iv Access Insert/Monitor (6/26/18 18:09)


Ecg Monitoring (6/26/18 18:09)


Oximetry (6/26/18 18:09)


Sodium Chlor 0.9% 1000 Ml Inj (Ns 1000 M (6/26/18 18:09)


Sodium Chloride 0.9% Flush (Ns Flush) (6/26/18 18:15)


Electrocardiogram (6/26/18 18:09)


Sodium Chlor 0.9% 1000 Ml Inj (Ns 1000 M (6/26/18 18:15)


Ondansetron  Odt (Zofran  Odt) (6/26/18 18:15)


Electrocardiogram (6/26/18 )


Troponin I (6/26/18 18:09)


Ckmb (Isoenzyme) Profile (6/26/18 18:09)


Acetaminophen (Tylenol) (6/26/18 18:15)


Influenzae A/B Antigen (6/26/18 18:11)


Metoclopramide Inj (Reglan Inj) (6/26/18 19:45)





Labs





Laboratory Tests








Test


  6/26/18


18:26


 


White Blood Count 9.6 TH/MM3 


 


Red Blood Count 5.57 MIL/MM3 


 


Hemoglobin 16.9 GM/DL 


 


Hematocrit 50.1 % 


 


Mean Corpuscular Volume 89.9 FL 


 


Mean Corpuscular Hemoglobin 30.4 PG 


 


Mean Corpuscular Hemoglobin


Concent 33.9 % 


 


 


Red Cell Distribution Width 13.9 % 


 


Platelet Count 152 TH/MM3 


 


Mean Platelet Volume 9.1 FL 


 


Neutrophils (%) (Auto) 85.7 % 


 


Lymphocytes (%) (Auto) 6.6 % 


 


Monocytes (%) (Auto) 7.4 % 


 


Eosinophils (%) (Auto) 0.1 % 


 


Basophils (%) (Auto) 0.2 % 


 


Neutrophils # (Auto) 8.3 TH/MM3 


 


Lymphocytes # (Auto) 0.6 TH/MM3 


 


Monocytes # (Auto) 0.7 TH/MM3 


 


Eosinophils # (Auto) 0.0 TH/MM3 


 


Basophils # (Auto) 0.0 TH/MM3 


 


CBC Comment DIFF FINAL 


 


Differential Comment  


 


Prothrombin Time 10.1 SEC 


 


Prothromb Time International


Ratio 1.0 RATIO 


 


 


Activated Partial


Thromboplast Time 26.8 SEC 


 


 


Blood Urea Nitrogen 9 MG/DL 


 


Creatinine 1.11 MG/DL 


 


Random Glucose 93 MG/DL 


 


Total Protein 7.6 GM/DL 


 


Albumin 3.8 GM/DL 


 


Calcium Level 8.7 MG/DL 


 


Alkaline Phosphatase 85 U/L 


 


Aspartate Amino Transf


(AST/SGOT) 17 U/L 


 


 


Alanine Aminotransferase


(ALT/SGPT) 21 U/L 


 


 


Total Bilirubin 0.4 MG/DL 


 


Sodium Level 136 MEQ/L 


 


Potassium Level 4.0 MEQ/L 


 


Chloride Level 104 MEQ/L 


 


Carbon Dioxide Level 22.6 MEQ/L 


 


Anion Gap 9 MEQ/L 


 


Estimat Glomerular Filtration


Rate 71 ML/MIN 


 


 


Lactic Acid Level 1.8 mmol/L 


 


Total Creatine Kinase 82 U/L 


 


Troponin I


  LESS THAN 0.02


NG/ML











[facility]


Medical Record Reviewed:  Yes


Supervised Visit with RILEY:  No


Narrative Course


See previous providers notes for complete history of present illness.  I 

assumed care of this patient pending lab work.  Briefly this is a 47-year-old 

male who presents with myalgias, nausea and vomiting.  Symptoms started after 

work yesterday evening.  He reports that he works out in the heat and he feels 

like he is dehydrated.  He reports that he has made little urine output today.  

He is denying chest pain, shortness of breath, headache, cough, congestion, 

sore throat, rash he denies any history of IV drug use.  Upon examination he is 

feeling improved after the administration of IV fluids.  He has been able to 

tolerate some sips of oral hydration.  CBC, CMP, CK, troponin, lactic acid 

within normal limits.  EKG reveals normal sinus rhythm.


2000: Patient is feeling improved, currently sitting on the edge of the bed 

drinking Gatorade.  At this point time the plan would be to discharge him with 

prescription for Zofran.  Discussed the importance of hydrating while working 

outdoors in the summer.  Discussed signs and symptoms that would warrant 

returning to the emergency room.  He is stable for discharge.


Diagnosis





 Primary Impression:  


 Heat effects


 Additional Impression:  


 Dehydration


Departure Forms:  Tests/Procedures, Work Release   Enter return to work date:  

Jun 28, 2018





***Additional Instruction:  


Zofran as needed for nausea.  Slowly advance diet as tolerated.  Stay well 

hydrated and covered while working outdoors.  Return for any new or worsening 

symptoms.


***Med/Other Pt SpecificInfo:  Prescription(s) given


Scripts


Ondansetron (Zofran) 4 Mg Tab


4 MG PO Q6HR Y for NAUSEA OR VOMITING, #20 TAB 0 Refills


   Prov: Jose Lantigua MD         6/26/18


Disposition:  01 DISCHARGE HOME


Condition:  Stable











Dominik Driscoll Jun 26, 2018 19:21

## 2018-06-27 NOTE — EKG
Date Performed: 06/26/2018       Time Performed: 19:47:56

 

PTAGE:      47 years

 

EKG:      Sinus rhythm 

 

 NORMAL ECG

 

PREVIOUS TRACING       : 08/24/2017 11.24 Since the previous tracing, no significant change noted

 

DOCTOR:   Valente Paige  Interpretating Date/Time  06/27/2018 20:51:52